# Patient Record
Sex: FEMALE | Race: OTHER | HISPANIC OR LATINO | URBAN - METROPOLITAN AREA
[De-identification: names, ages, dates, MRNs, and addresses within clinical notes are randomized per-mention and may not be internally consistent; named-entity substitution may affect disease eponyms.]

---

## 2017-09-23 ENCOUNTER — EMERGENCY (EMERGENCY)
Facility: HOSPITAL | Age: 47
LOS: 1 days | Discharge: HOME | End: 2017-09-23
Attending: EMERGENCY MEDICINE
Payer: MEDICAID

## 2017-09-23 VITALS
HEIGHT: 64 IN | TEMPERATURE: 98 F | HEART RATE: 69 BPM | SYSTOLIC BLOOD PRESSURE: 123 MMHG | OXYGEN SATURATION: 100 % | WEIGHT: 130.07 LBS | DIASTOLIC BLOOD PRESSURE: 64 MMHG | RESPIRATION RATE: 18 BRPM

## 2017-09-23 PROCEDURE — 99283 EMERGENCY DEPT VISIT LOW MDM: CPT

## 2017-09-23 PROCEDURE — 96372 THER/PROPH/DIAG INJ SC/IM: CPT

## 2017-09-23 PROCEDURE — 99283 EMERGENCY DEPT VISIT LOW MDM: CPT | Mod: 25

## 2017-09-23 RX ORDER — DOCUSATE SODIUM 100 MG
100 CAPSULE ORAL ONCE
Qty: 0 | Refills: 0 | Status: COMPLETED | OUTPATIENT
Start: 2017-09-23 | End: 2017-09-23

## 2017-09-23 RX ORDER — LIDOCAINE 4 G/100G
1 CREAM TOPICAL ONCE
Qty: 0 | Refills: 0 | Status: COMPLETED | OUTPATIENT
Start: 2017-09-23 | End: 2017-09-23

## 2017-09-23 RX ORDER — DOCUSATE SODIUM 100 MG
1 CAPSULE ORAL
Qty: 60 | Refills: 0
Start: 2017-09-23 | End: 2017-10-23

## 2017-09-23 RX ORDER — IBUPROFEN 200 MG
1 TABLET ORAL
Qty: 30 | Refills: 0
Start: 2017-09-23 | End: 2017-10-03

## 2017-09-23 RX ORDER — HYDROCORTISONE 1 %
1 OINTMENT (GRAM) TOPICAL ONCE
Qty: 0 | Refills: 0 | Status: COMPLETED | OUTPATIENT
Start: 2017-09-23 | End: 2017-09-23

## 2017-09-23 RX ORDER — KETOROLAC TROMETHAMINE 30 MG/ML
60 SYRINGE (ML) INJECTION ONCE
Qty: 0 | Refills: 0 | Status: DISCONTINUED | OUTPATIENT
Start: 2017-09-23 | End: 2017-09-23

## 2017-09-23 RX ORDER — HYDROCORTISONE 1 %
1 OINTMENT (GRAM) TOPICAL
Qty: 1 | Refills: 0
Start: 2017-09-23 | End: 2017-10-03

## 2017-09-23 RX ADMIN — Medication 60 MILLIGRAM(S): at 19:31

## 2017-09-23 RX ADMIN — Medication 1 APPLICATION(S): at 19:28

## 2017-09-23 RX ADMIN — LIDOCAINE 1 APPLICATION(S): 4 CREAM TOPICAL at 19:29

## 2017-09-23 RX ADMIN — Medication 100 MILLIGRAM(S): at 18:52

## 2017-09-23 RX ADMIN — Medication 60 MILLIGRAM(S): at 18:52

## 2017-09-23 NOTE — ED PROVIDER NOTE - OBJECTIVE STATEMENT
45 y/o F pt with PMHx of hemorrhoids (during pregnancy) presents to ED c/o rectal pain secondary to hemorrhoids x 3 days. Pt states that she tried using Preparation H and Sitz bath with no relief of sx's. Pt denies fever, chills, vomiting, or any other complaints. NKDA

## 2017-09-23 NOTE — ED ADULT NURSE NOTE - OBJECTIVE STATEMENT
Patient presents to ED complains of hemorrhoids. Denies fever, chills, bleeding at site. Patient presents to ED with pain from hemorrhoids. Denies fever, chills, bleeding at site. Family at bedside. MD evaluation in progress.

## 2017-09-27 DIAGNOSIS — K64.4 RESIDUAL HEMORRHOIDAL SKIN TAGS: ICD-10-CM

## 2017-10-01 ENCOUNTER — EMERGENCY (EMERGENCY)
Facility: HOSPITAL | Age: 47
LOS: 1 days | Discharge: HOME | End: 2017-10-01
Attending: EMERGENCY MEDICINE
Payer: MEDICAID

## 2017-10-01 VITALS
HEART RATE: 77 BPM | WEIGHT: 130.07 LBS | DIASTOLIC BLOOD PRESSURE: 77 MMHG | RESPIRATION RATE: 18 BRPM | SYSTOLIC BLOOD PRESSURE: 114 MMHG | OXYGEN SATURATION: 100 % | HEIGHT: 64 IN

## 2017-10-01 VITALS
RESPIRATION RATE: 20 BRPM | HEART RATE: 74 BPM | OXYGEN SATURATION: 100 % | SYSTOLIC BLOOD PRESSURE: 112 MMHG | DIASTOLIC BLOOD PRESSURE: 77 MMHG

## 2017-10-01 DIAGNOSIS — R05 COUGH: ICD-10-CM

## 2017-10-01 DIAGNOSIS — R11.0 NAUSEA: ICD-10-CM

## 2017-10-01 LAB
ALBUMIN SERPL ELPH-MCNC: 3.8 G/DL — SIGNIFICANT CHANGE UP (ref 3.5–5)
ALP SERPL-CCNC: 75 U/L — SIGNIFICANT CHANGE UP (ref 40–120)
ALT FLD-CCNC: 24 U/L DA — SIGNIFICANT CHANGE UP (ref 10–60)
ANION GAP SERPL CALC-SCNC: 7 MMOL/L — SIGNIFICANT CHANGE UP (ref 5–17)
APPEARANCE UR: CLEAR — SIGNIFICANT CHANGE UP
AST SERPL-CCNC: 14 U/L — SIGNIFICANT CHANGE UP (ref 10–40)
BASOPHILS # BLD AUTO: 0.1 K/UL — SIGNIFICANT CHANGE UP (ref 0–0.2)
BASOPHILS NFR BLD AUTO: 1.5 % — SIGNIFICANT CHANGE UP (ref 0–2)
BILIRUB SERPL-MCNC: 0.4 MG/DL — SIGNIFICANT CHANGE UP (ref 0.2–1.2)
BILIRUB UR-MCNC: NEGATIVE — SIGNIFICANT CHANGE UP
BUN SERPL-MCNC: 9 MG/DL — SIGNIFICANT CHANGE UP (ref 7–18)
CALCIUM SERPL-MCNC: 8.7 MG/DL — SIGNIFICANT CHANGE UP (ref 8.4–10.5)
CHLORIDE SERPL-SCNC: 110 MMOL/L — HIGH (ref 96–108)
CK SERPL-CCNC: 78 U/L — SIGNIFICANT CHANGE UP (ref 21–215)
CO2 SERPL-SCNC: 25 MMOL/L — SIGNIFICANT CHANGE UP (ref 22–31)
COLOR SPEC: YELLOW — SIGNIFICANT CHANGE UP
CREAT SERPL-MCNC: 0.71 MG/DL — SIGNIFICANT CHANGE UP (ref 0.5–1.3)
DIFF PNL FLD: NEGATIVE — SIGNIFICANT CHANGE UP
EOSINOPHIL # BLD AUTO: 0.1 K/UL — SIGNIFICANT CHANGE UP (ref 0–0.5)
EOSINOPHIL NFR BLD AUTO: 1.9 % — SIGNIFICANT CHANGE UP (ref 0–6)
GLUCOSE SERPL-MCNC: 95 MG/DL — SIGNIFICANT CHANGE UP (ref 70–99)
GLUCOSE UR QL: NEGATIVE — SIGNIFICANT CHANGE UP
HCG UR QL: NEGATIVE — SIGNIFICANT CHANGE UP
HCT VFR BLD CALC: 37.1 % — SIGNIFICANT CHANGE UP (ref 34.5–45)
HGB BLD-MCNC: 11.1 G/DL — LOW (ref 11.5–15.5)
INR BLD: 1.15 RATIO — SIGNIFICANT CHANGE UP (ref 0.88–1.16)
KETONES UR-MCNC: NEGATIVE — SIGNIFICANT CHANGE UP
LEUKOCYTE ESTERASE UR-ACNC: NEGATIVE — SIGNIFICANT CHANGE UP
LYMPHOCYTES # BLD AUTO: 1.1 K/UL — SIGNIFICANT CHANGE UP (ref 1–3.3)
LYMPHOCYTES # BLD AUTO: 29.1 % — SIGNIFICANT CHANGE UP (ref 13–44)
MCHC RBC-ENTMCNC: 25.3 PG — LOW (ref 27–34)
MCHC RBC-ENTMCNC: 30 GM/DL — LOW (ref 32–36)
MCV RBC AUTO: 84.5 FL — SIGNIFICANT CHANGE UP (ref 80–100)
MONOCYTES # BLD AUTO: 0.4 K/UL — SIGNIFICANT CHANGE UP (ref 0–0.9)
MONOCYTES NFR BLD AUTO: 10.4 % — SIGNIFICANT CHANGE UP (ref 2–14)
NEUTROPHILS # BLD AUTO: 2.2 K/UL — SIGNIFICANT CHANGE UP (ref 1.8–7.4)
NEUTROPHILS NFR BLD AUTO: 57.1 % — SIGNIFICANT CHANGE UP (ref 43–77)
NITRITE UR-MCNC: NEGATIVE — SIGNIFICANT CHANGE UP
PH UR: 7 — SIGNIFICANT CHANGE UP (ref 5–8)
PLATELET # BLD AUTO: 157 K/UL — SIGNIFICANT CHANGE UP (ref 150–400)
POTASSIUM SERPL-MCNC: 4.3 MMOL/L — SIGNIFICANT CHANGE UP (ref 3.5–5.3)
POTASSIUM SERPL-SCNC: 4.3 MMOL/L — SIGNIFICANT CHANGE UP (ref 3.5–5.3)
PROT SERPL-MCNC: 7.8 G/DL — SIGNIFICANT CHANGE UP (ref 6–8.3)
PROT UR-MCNC: NEGATIVE — SIGNIFICANT CHANGE UP
PROTHROM AB SERPL-ACNC: 12.6 SEC — SIGNIFICANT CHANGE UP (ref 9.8–12.7)
RBC # BLD: 4.39 M/UL — SIGNIFICANT CHANGE UP (ref 3.8–5.2)
RBC # FLD: 14 % — SIGNIFICANT CHANGE UP (ref 10.3–14.5)
SODIUM SERPL-SCNC: 142 MMOL/L — SIGNIFICANT CHANGE UP (ref 135–145)
SP GR SPEC: 1 — LOW (ref 1.01–1.02)
TROPONIN I SERPL-MCNC: <0.015 NG/ML — SIGNIFICANT CHANGE UP (ref 0–0.04)
UROBILINOGEN FLD QL: NEGATIVE — SIGNIFICANT CHANGE UP
WBC # BLD: 3.9 K/UL — SIGNIFICANT CHANGE UP (ref 3.8–10.5)
WBC # FLD AUTO: 3.9 K/UL — SIGNIFICANT CHANGE UP (ref 3.8–10.5)

## 2017-10-01 PROCEDURE — 71020: CPT | Mod: 26

## 2017-10-01 PROCEDURE — 99285 EMERGENCY DEPT VISIT HI MDM: CPT

## 2017-10-01 PROCEDURE — 94640 AIRWAY INHALATION TREATMENT: CPT

## 2017-10-01 PROCEDURE — 93005 ELECTROCARDIOGRAM TRACING: CPT

## 2017-10-01 PROCEDURE — 84484 ASSAY OF TROPONIN QUANT: CPT

## 2017-10-01 PROCEDURE — 85610 PROTHROMBIN TIME: CPT

## 2017-10-01 PROCEDURE — 81025 URINE PREGNANCY TEST: CPT

## 2017-10-01 PROCEDURE — 82550 ASSAY OF CK (CPK): CPT

## 2017-10-01 PROCEDURE — 71046 X-RAY EXAM CHEST 2 VIEWS: CPT

## 2017-10-01 PROCEDURE — 96374 THER/PROPH/DIAG INJ IV PUSH: CPT

## 2017-10-01 PROCEDURE — 85027 COMPLETE CBC AUTOMATED: CPT

## 2017-10-01 PROCEDURE — 99284 EMERGENCY DEPT VISIT MOD MDM: CPT | Mod: 25

## 2017-10-01 PROCEDURE — 80053 COMPREHEN METABOLIC PANEL: CPT

## 2017-10-01 PROCEDURE — 81003 URINALYSIS AUTO W/O SCOPE: CPT

## 2017-10-01 RX ORDER — SODIUM CHLORIDE 9 MG/ML
1000 INJECTION INTRAMUSCULAR; INTRAVENOUS; SUBCUTANEOUS ONCE
Qty: 0 | Refills: 0 | Status: COMPLETED | OUTPATIENT
Start: 2017-10-01 | End: 2017-10-01

## 2017-10-01 RX ORDER — ALBUTEROL 90 UG/1
2 AEROSOL, METERED ORAL
Qty: 1 | Refills: 0
Start: 2017-10-01 | End: 2017-10-08

## 2017-10-01 RX ORDER — IPRATROPIUM/ALBUTEROL SULFATE 18-103MCG
3 AEROSOL WITH ADAPTER (GRAM) INHALATION ONCE
Qty: 0 | Refills: 0 | Status: COMPLETED | OUTPATIENT
Start: 2017-10-01 | End: 2017-10-01

## 2017-10-01 RX ORDER — ONDANSETRON 8 MG/1
4 TABLET, FILM COATED ORAL ONCE
Qty: 0 | Refills: 0 | Status: COMPLETED | OUTPATIENT
Start: 2017-10-01 | End: 2017-10-01

## 2017-10-01 RX ORDER — ONDANSETRON 8 MG/1
1 TABLET, FILM COATED ORAL
Qty: 6 | Refills: 0
Start: 2017-10-01 | End: 2017-10-03

## 2017-10-01 RX ADMIN — SODIUM CHLORIDE 2000 MILLILITER(S): 9 INJECTION INTRAMUSCULAR; INTRAVENOUS; SUBCUTANEOUS at 12:32

## 2017-10-01 RX ADMIN — Medication 3 MILLILITER(S): at 13:31

## 2017-10-01 RX ADMIN — Medication 3 MILLILITER(S): at 12:56

## 2017-10-01 RX ADMIN — ONDANSETRON 4 MILLIGRAM(S): 8 TABLET, FILM COATED ORAL at 12:31

## 2017-10-01 NOTE — ED PROVIDER NOTE - MEDICAL DECISION MAKING DETAILS
Pt c/o of dry cough consistent with bronchitis v pneumonia. CXR negative, will give Duoneb. Pt's VSS, afebrile, and denies nausea or vomiting s/p Zofran; plan to discharge home with pulmonary f/u due to length of cough, and supportive care. Pt c/o of dry cough consistent with bronchitis v pneumonia. CXR negative, will give Duoneb. Pt's VSS, afebrile, and denies nausea or vomiting s/p Zofran, negative pregnancy, no associated sx; plan to discharge home with pulmonary f/u due to length of cough, and supportive care.

## 2017-10-01 NOTE — ED PROVIDER NOTE - PROGRESS NOTE DETAILS
confirmed improvement with duonebs, discussed cxr findings, no hx ca, no unintentional weight loss, will dc home with results and follow up with pulmonology and pmd for repeat imaging.

## 2017-10-01 NOTE — ED PROVIDER NOTE - ATTENDING CONTRIBUTION TO CARE
47 y/o F pt with no significant pmhx presents to ED c/o worsening dry cough x 2 weeks with associated sore throat and chest discomfort.  Patient denies F/C, sick contacts, or chest pain.    JOCELIN BELLE, ATTENDING NOTE:  Patient is awake and alert and in no acute distress.  Normocephalic/atraumatic.  Auricles are normal.  Neck supple.  Lungs B/L diffuse wheeze.  Heart is regular rate and rhythm.  Abdomen is soft, not distended +BS.  Back is nontender, no CVAT.  Moving all 4 extremities.   Neurologically grossly intact.  Affect is appropriate.   DR. BELLE, ATTENDING MD-  I performed a face to face bedside interview with patient regarding history of present illness, review of symptoms and past medical history. I completed an independent physical exam.  I have discussed patient's plan of care with the NP.   I agree with note as stated above, having amended the EMR as needed to reflect my findings. I have discussed the assessment and plan of care.  This includes during the time I functioned as the attending physician for this patient.

## 2017-10-01 NOTE — ED PROVIDER NOTE - NS_ ATTENDINGSCRIBEDETAILS _ED_A_ED_FT
The scribe's documentation has been prepared under the direction of Sushila Marshall (NP).  I've reviewed the EMR and agree with the note as documented.

## 2017-10-01 NOTE — ED PROVIDER NOTE - ENMT, MLM
Airway patent, Nasal mucosa clear. Mouth with normal mucosa. Throat has no vesicles, no oropharyngeal exudates and uvula is midline. No frontal maxillary tenderness.

## 2017-10-01 NOTE — ED ADULT NURSE NOTE - OBJECTIVE STATEMENT
pt came in 45 y/o female with c/o chest pain and dizziness x 3 days pt alert oriented x 3 with no acute distress

## 2017-10-01 NOTE — ED PROVIDER NOTE - OBJECTIVE STATEMENT
45 y/o F pt with PMHx of hemorrhoids presents to ED c/o worsening dry cough with associated sx's of sore throat and chest discomfort (secondary to vomiting x last night and this morning; nonbloody and nonbilious) x 2 weeks. Pt denies sick contacts, fever, chills, hematuria, dysuria, melena, diarrhea, nasal congestion, or any other complaints. NKDA. 45 y/o F pt with no significant pmhx presents to ED c/o worsening dry cough x2 weeks with associated sx's of sore throat and chest discomfort (secondary to nb/nb vomiting last night. Pt denies sick contacts, fever, chills, cp, palpitations, sob, dyspnea, diaphoresis, abdominal pain, bladder discomfort, hematuria, dysuria, melena, diarrhea, unintentional weight loss, hx ca, or any other complaints. NKDA. Nonsmoker. No hx asthma. 45 y/o F pt with no significant pmhx presents to ED c/o worsening dry cough x2 weeks with associated sx's of sore throat and chest discomfort (secondary to nb/nb vomiting last night). Pt denies sick contacts, fever, chills, cp, palpitations, sob, dyspnea, diaphoresis, abdominal pain, bladder discomfort, hematuria, dysuria, melena, diarrhea, unintentional weight loss, hx ca, or any other complaints. NKDA. Nonsmoker. No hx asthma.

## 2019-08-29 ENCOUNTER — EMERGENCY (EMERGENCY)
Facility: HOSPITAL | Age: 49
LOS: 1 days | Discharge: HOME | End: 2019-08-29
Attending: EMERGENCY MEDICINE
Payer: MEDICAID

## 2019-08-29 VITALS
TEMPERATURE: 98 F | OXYGEN SATURATION: 99 % | DIASTOLIC BLOOD PRESSURE: 65 MMHG | HEART RATE: 68 BPM | SYSTOLIC BLOOD PRESSURE: 98 MMHG | RESPIRATION RATE: 18 BRPM

## 2019-08-29 VITALS
HEART RATE: 75 BPM | SYSTOLIC BLOOD PRESSURE: 111 MMHG | RESPIRATION RATE: 22 BRPM | OXYGEN SATURATION: 100 % | TEMPERATURE: 99 F | DIASTOLIC BLOOD PRESSURE: 75 MMHG

## 2019-08-29 PROBLEM — K64.9 UNSPECIFIED HEMORRHOIDS: Chronic | Status: ACTIVE | Noted: 2017-09-23

## 2019-08-29 LAB
ALBUMIN SERPL ELPH-MCNC: 3.5 G/DL — SIGNIFICANT CHANGE UP (ref 3.5–5)
ALP SERPL-CCNC: 85 U/L — SIGNIFICANT CHANGE UP (ref 40–120)
ALT FLD-CCNC: 19 U/L DA — SIGNIFICANT CHANGE UP (ref 10–60)
ANION GAP SERPL CALC-SCNC: 6 MMOL/L — SIGNIFICANT CHANGE UP (ref 5–17)
AST SERPL-CCNC: 12 U/L — SIGNIFICANT CHANGE UP (ref 10–40)
BILIRUB SERPL-MCNC: 0.4 MG/DL — SIGNIFICANT CHANGE UP (ref 0.2–1.2)
BUN SERPL-MCNC: 7 MG/DL — SIGNIFICANT CHANGE UP (ref 7–18)
CALCIUM SERPL-MCNC: 8.2 MG/DL — LOW (ref 8.4–10.5)
CHLORIDE SERPL-SCNC: 111 MMOL/L — HIGH (ref 96–108)
CO2 SERPL-SCNC: 25 MMOL/L — SIGNIFICANT CHANGE UP (ref 22–31)
CREAT SERPL-MCNC: 0.77 MG/DL — SIGNIFICANT CHANGE UP (ref 0.5–1.3)
D DIMER BLD IA.RAPID-MCNC: 640 NG/ML DDU — HIGH
GLUCOSE SERPL-MCNC: 97 MG/DL — SIGNIFICANT CHANGE UP (ref 70–99)
HCG UR QL: NEGATIVE — SIGNIFICANT CHANGE UP
HCT VFR BLD CALC: 33.1 % — LOW (ref 34.5–45)
HGB BLD-MCNC: 10.2 G/DL — LOW (ref 11.5–15.5)
MCHC RBC-ENTMCNC: 25.8 PG — LOW (ref 27–34)
MCHC RBC-ENTMCNC: 30.8 GM/DL — LOW (ref 32–36)
MCV RBC AUTO: 83.6 FL — SIGNIFICANT CHANGE UP (ref 80–100)
NRBC # BLD: 0 /100 WBCS — SIGNIFICANT CHANGE UP (ref 0–0)
PLATELET # BLD AUTO: 212 K/UL — SIGNIFICANT CHANGE UP (ref 150–400)
POTASSIUM SERPL-MCNC: 4 MMOL/L — SIGNIFICANT CHANGE UP (ref 3.5–5.3)
POTASSIUM SERPL-SCNC: 4 MMOL/L — SIGNIFICANT CHANGE UP (ref 3.5–5.3)
PROT SERPL-MCNC: 7.3 G/DL — SIGNIFICANT CHANGE UP (ref 6–8.3)
RBC # BLD: 3.96 M/UL — SIGNIFICANT CHANGE UP (ref 3.8–5.2)
RBC # FLD: 14.6 % — HIGH (ref 10.3–14.5)
SODIUM SERPL-SCNC: 142 MMOL/L — SIGNIFICANT CHANGE UP (ref 135–145)
TROPONIN I SERPL-MCNC: <0.015 NG/ML — SIGNIFICANT CHANGE UP (ref 0–0.04)
WBC # BLD: 4.1 K/UL — SIGNIFICANT CHANGE UP (ref 3.8–10.5)
WBC # FLD AUTO: 4.1 K/UL — SIGNIFICANT CHANGE UP (ref 3.8–10.5)

## 2019-08-29 PROCEDURE — 96374 THER/PROPH/DIAG INJ IV PUSH: CPT | Mod: XU

## 2019-08-29 PROCEDURE — 99285 EMERGENCY DEPT VISIT HI MDM: CPT

## 2019-08-29 PROCEDURE — 71275 CT ANGIOGRAPHY CHEST: CPT | Mod: 26

## 2019-08-29 PROCEDURE — 99284 EMERGENCY DEPT VISIT MOD MDM: CPT | Mod: 25

## 2019-08-29 PROCEDURE — 93005 ELECTROCARDIOGRAM TRACING: CPT

## 2019-08-29 PROCEDURE — 71275 CT ANGIOGRAPHY CHEST: CPT

## 2019-08-29 PROCEDURE — 36415 COLL VENOUS BLD VENIPUNCTURE: CPT

## 2019-08-29 PROCEDURE — 81025 URINE PREGNANCY TEST: CPT

## 2019-08-29 PROCEDURE — 71046 X-RAY EXAM CHEST 2 VIEWS: CPT | Mod: 26

## 2019-08-29 PROCEDURE — 93010 ELECTROCARDIOGRAM REPORT: CPT

## 2019-08-29 PROCEDURE — 85027 COMPLETE CBC AUTOMATED: CPT

## 2019-08-29 PROCEDURE — 85379 FIBRIN DEGRADATION QUANT: CPT

## 2019-08-29 PROCEDURE — 71046 X-RAY EXAM CHEST 2 VIEWS: CPT

## 2019-08-29 PROCEDURE — 84484 ASSAY OF TROPONIN QUANT: CPT

## 2019-08-29 PROCEDURE — 80053 COMPREHEN METABOLIC PANEL: CPT

## 2019-08-29 RX ORDER — KETOROLAC TROMETHAMINE 30 MG/ML
15 SYRINGE (ML) INJECTION ONCE
Refills: 0 | Status: DISCONTINUED | OUTPATIENT
Start: 2019-08-29 | End: 2019-08-29

## 2019-08-29 RX ORDER — ACETAMINOPHEN 500 MG
650 TABLET ORAL ONCE
Refills: 0 | Status: COMPLETED | OUTPATIENT
Start: 2019-08-29 | End: 2019-08-29

## 2019-08-29 RX ADMIN — Medication 15 MILLIGRAM(S): at 14:30

## 2019-08-29 RX ADMIN — Medication 650 MILLIGRAM(S): at 14:29

## 2019-08-29 NOTE — ED PROVIDER NOTE - PATIENT PORTAL LINK FT
You can access the FollowMyHealth Patient Portal offered by [facility] by registering at the following website: [email or web address]. By joining [employer]’s FollowMyHealth portal, you will also be able to view your health information using other applications (apps) compatible with our system.

## 2019-08-29 NOTE — ED PROVIDER NOTE - CLINICAL SUMMARY MEDICAL DECISION MAKING FREE TEXT BOX
47 y/o F with no pmh present to the ED with CP. ekg reviewed and no signs of ischemia. labs are noncontributory. cxr- neg for PTX or consolidation.  pt still had pain after pain meds. will get d-dimer, though her PERC is neg.

## 2019-08-29 NOTE — ED ADULT NURSE NOTE - ED STAT RN HANDOFF DETAILS
Pt remains stable, AOX4, no s/s of any distress noted. IV line in place, IV fluids infusing as per orders, no signs of infiltration noted. Tolerating diet. VS WNL. Call bell in reach, bed in lowest position. Safety maintained, hourly rounding performed. Endorsed to nurse Sanford.

## 2019-08-29 NOTE — ED ADULT NURSE NOTE - OBJECTIVE STATEMENT
Pt AOx4, ambulatory, c/o left sided CP, and HA x 2 days, Pt denies SOB, syncope, fall/trauma. No changes in vision, or radiation of pain. EKG done. No distress noted.

## 2019-08-29 NOTE — ED ADULT NURSE NOTE - CHPI ED NUR SYMPTOMS NEG
no diaphoresis/no fever/no vomiting/no nausea/no back pain/no congestion/no syncope/no shortness of breath/no dizziness/no palpitations

## 2019-08-29 NOTE — ED PROVIDER NOTE - PROGRESS NOTE DETAILS
pt still had pain despite treatment. appears well. will get d-dimer Goss:  Pt received in signout from Dr. Florez, with plan to check CTA chest r/o PE and if negative for acute findings, to D/C home given that EKG and initial troponin negative and chest discomfort continuous over time course of 2 days.  CTA shows no PE.  Advised strict return precautions and PMD f/u.

## 2019-08-29 NOTE — ED PROVIDER NOTE - OBJECTIVE STATEMENT
47 y/o F with no significant PMHx presents to ED complaining of 2d of continuous sternal chest pain. Pt explains the pain is pleuritic and pressure like with no radiation. Pt states her pain started while she was sewing 2d ago. Pt denies any recent travel or sick contact. Pt also denies any leg swelling, fever, chills. nausea, vomiting, abdominal pain, back pain, or any other complaints. NKDA.

## 2019-09-08 ENCOUNTER — EMERGENCY (EMERGENCY)
Facility: HOSPITAL | Age: 49
LOS: 1 days | Discharge: HOME | End: 2019-09-08
Attending: EMERGENCY MEDICINE
Payer: MEDICAID

## 2019-09-08 VITALS
OXYGEN SATURATION: 100 % | HEIGHT: 64 IN | HEART RATE: 88 BPM | SYSTOLIC BLOOD PRESSURE: 110 MMHG | DIASTOLIC BLOOD PRESSURE: 76 MMHG | RESPIRATION RATE: 18 BRPM | TEMPERATURE: 98 F | WEIGHT: 119.93 LBS

## 2019-09-08 DIAGNOSIS — Z90.49 ACQUIRED ABSENCE OF OTHER SPECIFIED PARTS OF DIGESTIVE TRACT: Chronic | ICD-10-CM

## 2019-09-08 DIAGNOSIS — Z90.79 ACQUIRED ABSENCE OF OTHER GENITAL ORGAN(S): Chronic | ICD-10-CM

## 2019-09-08 PROCEDURE — 96372 THER/PROPH/DIAG INJ SC/IM: CPT

## 2019-09-08 PROCEDURE — 99283 EMERGENCY DEPT VISIT LOW MDM: CPT

## 2019-09-08 PROCEDURE — 99283 EMERGENCY DEPT VISIT LOW MDM: CPT | Mod: 25

## 2019-09-08 RX ORDER — HYDROCORTISONE 1 %
1 OINTMENT (GRAM) TOPICAL
Qty: 1 | Refills: 0
Start: 2019-09-08 | End: 2019-09-17

## 2019-09-08 RX ORDER — DOCUSATE SODIUM 100 MG
1 CAPSULE ORAL
Qty: 60 | Refills: 0
Start: 2019-09-08 | End: 2019-10-07

## 2019-09-08 RX ORDER — KETOROLAC TROMETHAMINE 30 MG/ML
30 SYRINGE (ML) INJECTION ONCE
Refills: 0 | Status: DISCONTINUED | OUTPATIENT
Start: 2019-09-08 | End: 2019-09-08

## 2019-09-08 RX ADMIN — Medication 30 MILLIGRAM(S): at 09:44

## 2019-09-08 NOTE — ED PROVIDER NOTE - PHYSICAL EXAMINATION
Afebrile, hemodynamically stable, saturating well  NAD, well appearing  Head NCAT  EOMI grossly, anicteric  MMM  RRR, nml S1/S2, no m/r/g  Lungs CTAB, no w/r/r  Abd soft, NT, ND, nml BS, no rebound or guarding  Rectal (scribe and RN as chaperone): noted multiple external hemorrhoids, no overlying erythema, some surrounding blood with no active bleeding  AAO, CN's 3-12 grossly intact  ALVAREZ spontaneously, no leg cyanosis or edema  Skin warm, well perfused, no rashes or hives

## 2019-09-08 NOTE — ED PROVIDER NOTE - CARE PROVIDER_API CALL
William La)  ColonRectal Surgery; Surgery  [facility], [address] [place] [address]  Phone: [phone]  Fax: [phone]  Follow Up Time: 1-3 Days

## 2019-09-08 NOTE — ED PROVIDER NOTE - OBJECTIVE STATEMENT
49 y/o F with PMHx of hemorrhoidectomy 30y ago presents to ED complaining of external hemorrhoids x1w.. Pt's daughter is at bedside. Pt states her pain is constant since Tuesday and has been getting worse since yesterday. Pt reports taking Tylenol to no relief. Pt adds that sometimes it is hard to poop but has normal bowel movements. Pt denies any other medical problems or taking any medications. Pt denies bleeding, or any other complaints. NKDA. 47 y/o F with PMHx of hemorrhoidectomy and persistent hemorrhoids, presents to ED complaining of external hemorrhoids x1w. Pt's daughter is at bedside. Pt states her pain is constant since Tuesday and has been getting worse since yesterday. Pt reports taking Tylenol to no relief. Pt adds that sometimes it is hard to poop but has normal bowel movements. Pt denies any other medical problems or taking any medications. Pt denies bleeding, abdominal pain, vomiting, or any other complaints. NKDA.

## 2019-09-08 NOTE — ED PROVIDER NOTE - CLINICAL SUMMARY MEDICAL DECISION MAKING FREE TEXT BOX
Does not appear to be thrombosed or superinfected. No active bleeding. Especially with past surgical hx in area and nonthrombosis will not attempt to open. Attempted reduction with minimal success 2/2 pain. Applied viscous topical lido and IM Toradol with improvement (pt emphatically denies possibility of pregnancy). Patient is nontoxic appearing, NAD, afebrile, hemodynamically stable. Discharged with rx Colace and Anucort, strict instructions in further symptomatic care including sitz and not to push, return precautions, and need for surgical f/u and given number for this.

## 2019-09-08 NOTE — ED PROVIDER NOTE - PATIENT PORTAL LINK FT
You can access the FollowMyHealth Patient Portal offered by [facility] by registering at the following website: [email or web address]. By joining [employer]’s FollowMyHealth portal, you will also be able to view your health information using other applications (apps) compatible with our system.

## 2019-09-08 NOTE — ED ADULT NURSE NOTE - NSFALLRSKHARMRISK_ED_ALL_ED
no Patient presents with complaints of generalized abdominal pain with most pain on right upper quadrant that started today after eating. Denies fever, chills , nausea, vomiting. Patient is alert and oriented x 4, normal sinus rhythm on cardiac monitor, respirations are even and unlabored, abdomen is soft and nontender, no edema noted. 20 gauge saline lock placed on left Ac, blood drawn and sent. UCG negative. Will follow up and monitor.

## 2019-09-19 ENCOUNTER — EMERGENCY (EMERGENCY)
Facility: HOSPITAL | Age: 49
LOS: 1 days | Discharge: LEFT BEFORE DISCHARGE | End: 2019-09-19
Attending: EMERGENCY MEDICINE
Payer: MEDICAID

## 2019-09-19 VITALS
RESPIRATION RATE: 16 BRPM | SYSTOLIC BLOOD PRESSURE: 124 MMHG | HEIGHT: 64 IN | TEMPERATURE: 99 F | WEIGHT: 119.93 LBS | OXYGEN SATURATION: 100 % | DIASTOLIC BLOOD PRESSURE: 77 MMHG | HEART RATE: 98 BPM

## 2019-09-19 DIAGNOSIS — Z90.49 ACQUIRED ABSENCE OF OTHER SPECIFIED PARTS OF DIGESTIVE TRACT: Chronic | ICD-10-CM

## 2019-09-19 DIAGNOSIS — Z90.79 ACQUIRED ABSENCE OF OTHER GENITAL ORGAN(S): Chronic | ICD-10-CM

## 2019-09-19 PROCEDURE — 99281 EMR DPT VST MAYX REQ PHY/QHP: CPT

## 2019-09-20 RX ORDER — IOHEXOL 300 MG/ML
30 INJECTION, SOLUTION INTRAVENOUS ONCE
Refills: 0 | Status: DISCONTINUED | OUTPATIENT
Start: 2019-09-20 | End: 2019-09-20

## 2020-09-29 ENCOUNTER — EMERGENCY (EMERGENCY)
Facility: HOSPITAL | Age: 50
LOS: 1 days | Discharge: HOME | End: 2020-09-29
Attending: EMERGENCY MEDICINE
Payer: MEDICAID

## 2020-09-29 VITALS
SYSTOLIC BLOOD PRESSURE: 117 MMHG | HEIGHT: 64 IN | RESPIRATION RATE: 18 BRPM | DIASTOLIC BLOOD PRESSURE: 77 MMHG | TEMPERATURE: 98 F | OXYGEN SATURATION: 98 % | HEART RATE: 90 BPM

## 2020-09-29 DIAGNOSIS — Z90.49 ACQUIRED ABSENCE OF OTHER SPECIFIED PARTS OF DIGESTIVE TRACT: Chronic | ICD-10-CM

## 2020-09-29 DIAGNOSIS — Z90.79 ACQUIRED ABSENCE OF OTHER GENITAL ORGAN(S): Chronic | ICD-10-CM

## 2020-09-29 PROCEDURE — 99283 EMERGENCY DEPT VISIT LOW MDM: CPT

## 2020-09-29 RX ORDER — LIDOCAINE 4 G/100G
1 CREAM TOPICAL
Qty: 6 | Refills: 0
Start: 2020-09-29 | End: 2020-09-30

## 2020-09-29 RX ORDER — OXYCODONE AND ACETAMINOPHEN 5; 325 MG/1; MG/1
1 TABLET ORAL ONCE
Refills: 0 | Status: DISCONTINUED | OUTPATIENT
Start: 2020-09-29 | End: 2020-09-29

## 2020-09-29 RX ORDER — LIDOCAINE 4 G/100G
1 CREAM TOPICAL ONCE
Refills: 0 | Status: COMPLETED | OUTPATIENT
Start: 2020-09-29 | End: 2020-09-29

## 2020-09-29 RX ORDER — OXYCODONE AND ACETAMINOPHEN 5; 325 MG/1; MG/1
1 TABLET ORAL
Qty: 10 | Refills: 0
Start: 2020-09-29 | End: 2020-10-01

## 2020-09-29 RX ADMIN — LIDOCAINE 1 PATCH: 4 CREAM TOPICAL at 11:34

## 2020-09-29 RX ADMIN — OXYCODONE AND ACETAMINOPHEN 1 TABLET(S): 5; 325 TABLET ORAL at 11:34

## 2020-09-29 NOTE — ED ADULT NURSE NOTE - OBJECTIVE STATEMENT
pt is here for rash.  pt stated that painful rashes to right back and under right breast for 1 week, denied fever or chills, c/o pain 6-8/10, denied chest pain or sob, no distress noted at this time.

## 2020-09-29 NOTE — ED PROVIDER NOTE - CLINICAL SUMMARY MEDICAL DECISION MAKING FREE TEXT BOX
pt with shingles for about two weeks  now with pain   given that the rash has scabbed over will give topical lidocaine.   also will give a short course of opiates. given length of time valtrex not indicated at this chago

## 2020-09-29 NOTE — ED ADULT TRIAGE NOTE - CHIEF COMPLAINT QUOTE
c/o painful rashes to Rt back and under Rt breast x 1 week , saw pmd was told has Herpes on meds but nit feeling better

## 2020-09-29 NOTE — ED PROVIDER NOTE - PATIENT PORTAL LINK FT
You can access the FollowMyHealth Patient Portal offered by [facility] by registering at the following website: [email or web address]. By joining [employer]’s FollowMyHealth portal, you will also be able to view your health information using other applications (apps) compatible with our system.

## 2020-09-29 NOTE — ED PROVIDER NOTE - OBJECTIVE STATEMENT
pt with shingles for about two weeks complaining about severe pain pt with shingles for about two weeks complaining about severe pain pt taking motrin at home with little to no relief

## 2020-09-29 NOTE — ED PROVIDER NOTE - NSFOLLOWUPINSTRUCTIONS_ED_ALL_ED_FT
Shingles    WHAT YOU NEED TO KNOW:    Shingles is a painful rash. Shingles is caused by the same virus that causes chickenpox (varicella-zoster). After you get chickenpox, the virus stays in your body for several years without causing any symptoms. Shingles occurs when the virus becomes active again. The active virus travels along a nerve to your skin and causes a rash.    DISCHARGE INSTRUCTIONS:    Call your local emergency number (911 in the ) if:   •You have trouble moving your arms, legs, or face.      •You become confused, or have difficulty speaking.      •You have a seizure.      Return to the emergency department if:   •You have weakness in an arm or leg.      •You have dizziness, a severe headache, or hearing or vision loss.      •You have painful, red, warm skin around the blisters, or the blisters drain pus.      •Your neck is stiff or you have trouble moving it.      Call your doctor if:   •You feel weak or have a headache.      •You have a cough, chills, or a fever.      •You have abdominal pain or nausea, or you are vomiting.      •Your rash becomes more itchy or painful.      •Your rash spreads to other parts of your body.      •Your pain worsens and does not go away even after you take medicine.      •You have questions or concerns about your condition or care.      Medicines: You may need any of the following:  •Antiviral medicine helps decrease symptoms and healing time. They may also decrease your risk of developing nerve pain. You will need to start taking them within 3 days of the start of symptoms to prevent nerve pain.      •Prescription pain medicine may be given. Ask your healthcare provider how to take this medicine safely. Some prescription pain medicines contain acetaminophen. Do not take other medicines that contain acetaminophen without talking to your healthcare provider. Too much acetaminophen may cause liver damage. Prescription pain medicine may cause constipation. Ask your healthcare provider how to prevent or treat constipation.       •Acetaminophen decreases pain and fever. It is available without a doctor's order. Ask how much to take and how often to take it. Follow directions. Read the labels of all other medicines you are using to see if they also contain acetaminophen, or ask your doctor or pharmacist. Acetaminophen can cause liver damage if not taken correctly. Do not use more than 4 grams (4,000 milligrams) total of acetaminophen in one day.       •NSAIDs, such as ibuprofen, help decrease swelling, pain, and fever. This medicine is available with or without a doctor's order. NSAIDs can cause stomach bleeding or kidney problems in certain people. If you take blood thinner medicine, always ask if NSAIDs are safe for you. Always read the medicine label and follow directions. Do not give these medicines to children under 6 months of age without direction from your child's healthcare provider.      •Topical anesthetics are used to numb the skin and decrease pain. They can be a cream, gel, spray, or patch.       •Anticonvulsants decrease nerve pain and may help you sleep at night.      •Antidepressants may be used to decrease nerve pain.      •Take your medicine as directed. Contact your healthcare provider if you think your medicine is not helping or if you have side effects. Tell him of her if you are allergic to any medicine. Keep a list of the medicines, vitamins, and herbs you take. Include the amounts, and when and why you take them. Bring the list or the pill bottles to follow-up visits. Carry your medicine list with you in case of an emergency.      Self-care: Keep your rash clean and dry. Cover your rash with a bandage or clothing. Do not use bandages that stick to your skin. The sticky part may irritate your skin and make your rash last longer.    Prevent the spread of germs:          •Wash your hands often. Wash your hands several times each day. Wash after you use the bathroom, change a child's diaper, and before you prepare or eat food. Use soap and water every time. Rub your soapy hands together, lacing your fingers. Wash the front and back of your hands, and in between your fingers. Use the fingers of one hand to scrub under the fingernails of the other hand. Wash for at least 20 seconds. Rinse with warm, running water for several seconds. Then dry your hands with a clean towel or paper towel. Use hand  that contains alcohol if soap and water are not available. Do not touch your eyes, nose, or mouth without washing your hands first.  Handwashing           •Cover a sneeze or cough. Use a tissue that covers your mouth and nose. Throw the tissue away in a trash can right away. Use the bend of your arm if a tissue is not available. Wash your hands well with soap and water or use a hand .      •Stay away from others while you are sick. Avoid crowds as much as possible.      •Ask about vaccines you may need. Talk to your healthcare provider about your vaccine history. He or she will tell you which vaccines you need, and when to get them.      Prevent shingles or another shingles outbreak: A vaccine may be given to help prevent shingles. You can get the vaccine even if you already had shingles. The vaccine can help prevent a future outbreak. If you do get shingles again, the vaccine can keep it from becoming severe. The vaccine comes in 2 forms. Your healthcare provider will tell you which form is right for you. The decision is based on your age and any medical conditions you have. A 2-dose vaccine is usually given to adults 50 years or older. A 1-dose vaccine may be given to adults 60 years or older.    For more information:   •Centers for Disease Control and Prevention  [address] [place]  Phone: [phone]  Phone: [phone]  Web Address: http://www.cdc.gov        Follow up with your doctor as directed: Write down your questions so you remember to ask them during your visits.       © Copyright [employer] 2020           back to top                          © Copyright [employer] 2020

## 2020-10-06 ENCOUNTER — EMERGENCY (EMERGENCY)
Facility: HOSPITAL | Age: 50
LOS: 1 days | Discharge: HOME | End: 2020-10-06
Attending: EMERGENCY MEDICINE
Payer: MEDICAID

## 2020-10-06 VITALS
SYSTOLIC BLOOD PRESSURE: 126 MMHG | RESPIRATION RATE: 16 BRPM | HEART RATE: 70 BPM | OXYGEN SATURATION: 98 % | TEMPERATURE: 98 F | DIASTOLIC BLOOD PRESSURE: 75 MMHG | WEIGHT: 139.99 LBS | HEIGHT: 64 IN

## 2020-10-06 VITALS — SYSTOLIC BLOOD PRESSURE: 139 MMHG | HEART RATE: 64 BPM | DIASTOLIC BLOOD PRESSURE: 83 MMHG

## 2020-10-06 DIAGNOSIS — Z90.79 ACQUIRED ABSENCE OF OTHER GENITAL ORGAN(S): Chronic | ICD-10-CM

## 2020-10-06 DIAGNOSIS — Z90.49 ACQUIRED ABSENCE OF OTHER SPECIFIED PARTS OF DIGESTIVE TRACT: Chronic | ICD-10-CM

## 2020-10-06 PROCEDURE — 96374 THER/PROPH/DIAG INJ IV PUSH: CPT

## 2020-10-06 PROCEDURE — 99284 EMERGENCY DEPT VISIT MOD MDM: CPT | Mod: 25

## 2020-10-06 PROCEDURE — 99284 EMERGENCY DEPT VISIT MOD MDM: CPT

## 2020-10-06 RX ORDER — OXYCODONE AND ACETAMINOPHEN 5; 325 MG/1; MG/1
1 TABLET ORAL
Qty: 10 | Refills: 0
Start: 2020-10-06 | End: 2020-10-09

## 2020-10-06 RX ORDER — MORPHINE SULFATE 50 MG/1
4 CAPSULE, EXTENDED RELEASE ORAL ONCE
Refills: 0 | Status: DISCONTINUED | OUTPATIENT
Start: 2020-10-06 | End: 2020-10-06

## 2020-10-06 RX ORDER — GABAPENTIN 400 MG/1
1 CAPSULE ORAL
Qty: 60 | Refills: 0
Start: 2020-10-06 | End: 2020-11-04

## 2020-10-06 RX ADMIN — MORPHINE SULFATE 4 MILLIGRAM(S): 50 CAPSULE, EXTENDED RELEASE ORAL at 21:35

## 2020-10-06 RX ADMIN — MORPHINE SULFATE 4 MILLIGRAM(S): 50 CAPSULE, EXTENDED RELEASE ORAL at 21:03

## 2020-10-06 NOTE — ED ADULT TRIAGE NOTE - CHIEF COMPLAINT QUOTE
pain right midaxillary , lower right ribs from rashes x 20 days , and pain left wrist /thumb x 5 days

## 2020-10-06 NOTE — ED PROVIDER NOTE - PATIENT PORTAL LINK FT
You can access the FollowMyHealth Patient Portal offered by [facility] by registering at the following website: [email or web address]. By joining [employer]’s FollowMyHealth portal, you will also be able to view your health information using other applications (apps) compatible with our system.

## 2020-10-06 NOTE — ED PROVIDER NOTE - PHYSICAL EXAMINATION
R flank/R side of abdomen scattered rash crusted over with small scabs to some areas. No crossing midline. No induration, fluctuance or streaking. Localized tenderness to palpation.

## 2020-10-06 NOTE — ED PROVIDER NOTE - OBJECTIVE STATEMENT
49 year-old female, presents with cc persistent severe pain to R flank/R abdomen area x 2 weeks. Reports that shingles appeared 2 weeks ago and since then has had severe pain. Was evaluated in the ER in 09/2020 and was given pain medications which she finished but she is still having severe pain. The rash has crusted over since the last visit. Denies any arm/leg pain, numbness, tingling, focal weakness, chest pain, SOB, fever, chills or any other complaints.

## 2020-10-06 NOTE — ED PROVIDER NOTE - NSFOLLOWUPINSTRUCTIONS_ED_ALL_ED_FT
Follow up with the primary care doctor in 2-3 days.    Instructions to take Gabapentin:  1 pill once a day for first day, then 1 pill twice a day for the next day, then 1 pill three times a day for the rest of the course of treatment. If you feel drowsy with the medication you can increase the frequency slower (e.g. 1 pill once a day for few days then twice a day for few days then eventually 3 times a day) to decrease the amplitude of the side effects.    If you experience any new or worsening symptoms or if you are concerned you can always come back to the emergency for a re-evaluation.    If there were any prescriptions given to you during the visit today take them as prescribed. If you have any questions you can ask the pharmacist.

## 2020-10-06 NOTE — ED PROVIDER NOTE - NSFOLLOWUPCLINICS_GEN_ALL_ED_FT
[facility]  Internal Medicine  [address] [place] [address]  Phone: [phone]  Fax: [phone]  Follow Up Time:

## 2020-10-06 NOTE — ED PROVIDER NOTE - ATTENDING CONTRIBUTION TO CARE
50 yo F with R flank pain after recent shingles infection     VS reviewed  R flank crusting/healing herpes infection  AAOx3    AP - healing herpes infx, possibly developing post herpetic neuralgia  dc with gabapentin and pcp fu

## 2021-04-10 ENCOUNTER — EMERGENCY (EMERGENCY)
Facility: HOSPITAL | Age: 51
LOS: 1 days | Discharge: HOME | End: 2021-04-10
Attending: STUDENT IN AN ORGANIZED HEALTH CARE EDUCATION/TRAINING PROGRAM
Payer: MEDICAID

## 2021-04-10 VITALS
TEMPERATURE: 98 F | WEIGHT: 100.09 LBS | OXYGEN SATURATION: 98 % | HEIGHT: 64 IN | SYSTOLIC BLOOD PRESSURE: 133 MMHG | DIASTOLIC BLOOD PRESSURE: 85 MMHG | HEART RATE: 70 BPM | RESPIRATION RATE: 18 BRPM

## 2021-04-10 DIAGNOSIS — Z90.79 ACQUIRED ABSENCE OF OTHER GENITAL ORGAN(S): Chronic | ICD-10-CM

## 2021-04-10 DIAGNOSIS — Z90.49 ACQUIRED ABSENCE OF OTHER SPECIFIED PARTS OF DIGESTIVE TRACT: Chronic | ICD-10-CM

## 2021-04-10 LAB
ALBUMIN SERPL ELPH-MCNC: 3.6 G/DL — SIGNIFICANT CHANGE UP (ref 3.5–5)
ALP SERPL-CCNC: 123 U/L — HIGH (ref 40–120)
ALT FLD-CCNC: 45 U/L DA — SIGNIFICANT CHANGE UP (ref 10–60)
ANION GAP SERPL CALC-SCNC: 4 MMOL/L — LOW (ref 5–17)
APPEARANCE UR: CLEAR — SIGNIFICANT CHANGE UP
AST SERPL-CCNC: 24 U/L — SIGNIFICANT CHANGE UP (ref 10–40)
BACTERIA # UR AUTO: ABNORMAL /HPF
BASOPHILS # BLD AUTO: 0.05 K/UL — SIGNIFICANT CHANGE UP (ref 0–0.2)
BASOPHILS NFR BLD AUTO: 0.9 % — SIGNIFICANT CHANGE UP (ref 0–2)
BILIRUB SERPL-MCNC: 0.2 MG/DL — SIGNIFICANT CHANGE UP (ref 0.2–1.2)
BILIRUB UR-MCNC: NEGATIVE — SIGNIFICANT CHANGE UP
BUN SERPL-MCNC: 13 MG/DL — SIGNIFICANT CHANGE UP (ref 7–18)
CALCIUM SERPL-MCNC: 8.5 MG/DL — SIGNIFICANT CHANGE UP (ref 8.4–10.5)
CHLORIDE SERPL-SCNC: 107 MMOL/L — SIGNIFICANT CHANGE UP (ref 96–108)
CO2 SERPL-SCNC: 28 MMOL/L — SIGNIFICANT CHANGE UP (ref 22–31)
COLOR SPEC: YELLOW — SIGNIFICANT CHANGE UP
CREAT SERPL-MCNC: 0.87 MG/DL — SIGNIFICANT CHANGE UP (ref 0.5–1.3)
DIFF PNL FLD: ABNORMAL
EOSINOPHIL # BLD AUTO: 0.13 K/UL — SIGNIFICANT CHANGE UP (ref 0–0.5)
EOSINOPHIL NFR BLD AUTO: 2.3 % — SIGNIFICANT CHANGE UP (ref 0–6)
EPI CELLS # UR: SIGNIFICANT CHANGE UP /HPF
GLUCOSE SERPL-MCNC: 95 MG/DL — SIGNIFICANT CHANGE UP (ref 70–99)
GLUCOSE UR QL: NEGATIVE — SIGNIFICANT CHANGE UP
HCT VFR BLD CALC: 39.3 % — SIGNIFICANT CHANGE UP (ref 34.5–45)
HGB BLD-MCNC: 13.4 G/DL — SIGNIFICANT CHANGE UP (ref 11.5–15.5)
IMM GRANULOCYTES NFR BLD AUTO: 0 % — SIGNIFICANT CHANGE UP (ref 0–1.5)
KETONES UR-MCNC: NEGATIVE — SIGNIFICANT CHANGE UP
LEUKOCYTE ESTERASE UR-ACNC: NEGATIVE — SIGNIFICANT CHANGE UP
LYMPHOCYTES # BLD AUTO: 2.11 K/UL — SIGNIFICANT CHANGE UP (ref 1–3.3)
LYMPHOCYTES # BLD AUTO: 37.7 % — SIGNIFICANT CHANGE UP (ref 13–44)
MCHC RBC-ENTMCNC: 30.6 PG — SIGNIFICANT CHANGE UP (ref 27–34)
MCHC RBC-ENTMCNC: 34.1 GM/DL — SIGNIFICANT CHANGE UP (ref 32–36)
MCV RBC AUTO: 89.7 FL — SIGNIFICANT CHANGE UP (ref 80–100)
MONOCYTES # BLD AUTO: 0.45 K/UL — SIGNIFICANT CHANGE UP (ref 0–0.9)
MONOCYTES NFR BLD AUTO: 8.1 % — SIGNIFICANT CHANGE UP (ref 2–14)
NEUTROPHILS # BLD AUTO: 2.85 K/UL — SIGNIFICANT CHANGE UP (ref 1.8–7.4)
NEUTROPHILS NFR BLD AUTO: 51 % — SIGNIFICANT CHANGE UP (ref 43–77)
NITRITE UR-MCNC: NEGATIVE — SIGNIFICANT CHANGE UP
NRBC # BLD: 0 /100 WBCS — SIGNIFICANT CHANGE UP (ref 0–0)
PH UR: 6 — SIGNIFICANT CHANGE UP (ref 5–8)
PLATELET # BLD AUTO: 184 K/UL — SIGNIFICANT CHANGE UP (ref 150–400)
POTASSIUM SERPL-MCNC: 4.1 MMOL/L — SIGNIFICANT CHANGE UP (ref 3.5–5.3)
POTASSIUM SERPL-SCNC: 4.1 MMOL/L — SIGNIFICANT CHANGE UP (ref 3.5–5.3)
PROT SERPL-MCNC: 7.3 G/DL — SIGNIFICANT CHANGE UP (ref 6–8.3)
PROT UR-MCNC: NEGATIVE — SIGNIFICANT CHANGE UP
RBC # BLD: 4.38 M/UL — SIGNIFICANT CHANGE UP (ref 3.8–5.2)
RBC # FLD: 11.7 % — SIGNIFICANT CHANGE UP (ref 10.3–14.5)
RBC CASTS # UR COMP ASSIST: ABNORMAL /HPF (ref 0–2)
SODIUM SERPL-SCNC: 139 MMOL/L — SIGNIFICANT CHANGE UP (ref 135–145)
SP GR SPEC: 1.01 — SIGNIFICANT CHANGE UP (ref 1.01–1.02)
UROBILINOGEN FLD QL: NEGATIVE — SIGNIFICANT CHANGE UP
WBC # BLD: 5.59 K/UL — SIGNIFICANT CHANGE UP (ref 3.8–10.5)
WBC # FLD AUTO: 5.59 K/UL — SIGNIFICANT CHANGE UP (ref 3.8–10.5)
WBC UR QL: SIGNIFICANT CHANGE UP /HPF (ref 0–5)

## 2021-04-10 PROCEDURE — 99283 EMERGENCY DEPT VISIT LOW MDM: CPT

## 2021-04-10 PROCEDURE — 85025 COMPLETE CBC W/AUTO DIFF WBC: CPT

## 2021-04-10 PROCEDURE — 36415 COLL VENOUS BLD VENIPUNCTURE: CPT

## 2021-04-10 PROCEDURE — 81001 URINALYSIS AUTO W/SCOPE: CPT

## 2021-04-10 PROCEDURE — 80053 COMPREHEN METABOLIC PANEL: CPT

## 2021-04-10 PROCEDURE — 99284 EMERGENCY DEPT VISIT MOD MDM: CPT

## 2021-04-10 RX ORDER — HYDROCORTISONE 1 %
1 OINTMENT (GRAM) TOPICAL ONCE
Refills: 0 | Status: COMPLETED | OUTPATIENT
Start: 2021-04-10 | End: 2021-04-10

## 2021-04-10 RX ORDER — LIDOCAINE/HYDROCORTISONE AC 3 %-0.5 %
1 CREAM WITH APPLICATOR RECTAL
Qty: 5 | Refills: 0
Start: 2021-04-10 | End: 2021-04-14

## 2021-04-10 RX ORDER — ACETAMINOPHEN 500 MG
975 TABLET ORAL ONCE
Refills: 0 | Status: COMPLETED | OUTPATIENT
Start: 2021-04-10 | End: 2021-04-10

## 2021-04-10 RX ORDER — CEFDINIR 250 MG/5ML
1 POWDER, FOR SUSPENSION ORAL
Qty: 10 | Refills: 0
Start: 2021-04-10 | End: 2021-04-14

## 2021-04-10 RX ADMIN — Medication 1 APPLICATION(S): at 21:51

## 2021-04-10 RX ADMIN — Medication 975 MILLIGRAM(S): at 21:06

## 2021-04-10 RX ADMIN — Medication 975 MILLIGRAM(S): at 21:51

## 2021-04-10 NOTE — ED PROVIDER NOTE - OBJECTIVE STATEMENT
51 y/o F pt with a PMHx of Chronic Hemorrhoids and Chronic abd pain (unknown source, already worked up for it) presents to ED c/o 1 week engorgement of external hemorrhoids as well as pain prior to that. Pt reportedly had a couple of bouts of rectal bleeding but since the engorgement of hemorrhoids has not had more rectal bleeding. Pt also c/o suprapubic type of pain although only sometimes, mostly when sitting or straining. Pt denies dysuria, hematuria, frequency, fever, chills, dizziness, syncope or any other acute complaints. NKDA.

## 2021-04-10 NOTE — ED PROVIDER NOTE - PATIENT PORTAL LINK FT
You can access the FollowMyHealth Patient Portal offered by [facility] by registering at the following website: [email or web address]. By joining [employer]’s FollowMyHealth portal, you will also be able to view your health information using other applications (apps) compatible with our system.

## 2021-04-10 NOTE — ED ADULT NURSE NOTE - OBJECTIVE STATEMENT
AOX4 +ambulatory patient reports lower abdominal pain for 1 week with mild nausea. Denies any urinary symptoms no fevers or chills

## 2021-04-10 NOTE — ED PROVIDER NOTE - CLINICAL SUMMARY MEDICAL DECISION MAKING FREE TEXT BOX
Since pt had several episodes of blood per rectum, will check CBC, will also check UA. Given suprapubic pain, will treat hemorrhoids with topical hydrocortisone 2.% and likely d/c w/close f/u with surgery. Since pt had several episodes of blood per rectum, will check CBC, will also check UA. Given suprapubic pain, will treat hemorrhoids with topical hydrocortisone 2.% and likely d/c w/close f/u with surgery.    UA w/ blood and WBC +bacteria. Will treat with cefdinir outpatient and topical hydrocortisone.

## 2021-04-10 NOTE — ED PROVIDER NOTE - RECTAL
Multiple external hemorrhoids mostly deflated but a couple indurated w/no bluish discoloration and no signs of strangulation. Digital rectal: no pain to the rectum, no abscesses, no internal hemorrhoids felt, no blood.

## 2021-04-11 PROBLEM — B02.9 ZOSTER WITHOUT COMPLICATIONS: Chronic | Status: ACTIVE | Noted: 2020-10-06

## 2021-04-13 PROBLEM — R10.9 UNSPECIFIED ABDOMINAL PAIN: Chronic | Status: ACTIVE | Noted: 2021-04-10

## 2021-05-04 ENCOUNTER — APPOINTMENT (OUTPATIENT)
Dept: GASTROENTEROLOGY | Facility: CLINIC | Age: 51
End: 2021-05-04
Payer: MEDICAID

## 2021-05-04 VITALS
HEART RATE: 64 BPM | TEMPERATURE: 96.9 F | SYSTOLIC BLOOD PRESSURE: 134 MMHG | BODY MASS INDEX: 25.27 KG/M2 | HEIGHT: 64 IN | OXYGEN SATURATION: 99 % | DIASTOLIC BLOOD PRESSURE: 83 MMHG | RESPIRATION RATE: 18 BRPM | WEIGHT: 148 LBS

## 2021-05-04 DIAGNOSIS — Z78.9 OTHER SPECIFIED HEALTH STATUS: ICD-10-CM

## 2021-05-04 DIAGNOSIS — Z01.818 ENCOUNTER FOR OTHER PREPROCEDURAL EXAMINATION: ICD-10-CM

## 2021-05-04 DIAGNOSIS — Z12.11 ENCOUNTER FOR SCREENING FOR MALIGNANT NEOPLASM OF COLON: ICD-10-CM

## 2021-05-04 DIAGNOSIS — K64.8 OTHER HEMORRHOIDS: ICD-10-CM

## 2021-05-04 DIAGNOSIS — K62.89 OTHER SPECIFIED DISEASES OF ANUS AND RECTUM: ICD-10-CM

## 2021-05-04 DIAGNOSIS — Z82.49 FAMILY HISTORY OF ISCHEMIC HEART DISEASE AND OTHER DISEASES OF THE CIRCULATORY SYSTEM: ICD-10-CM

## 2021-05-04 PROBLEM — Z00.00 ENCOUNTER FOR PREVENTIVE HEALTH EXAMINATION: Status: ACTIVE | Noted: 2021-05-04

## 2021-05-04 PROCEDURE — 99072 ADDL SUPL MATRL&STAF TM PHE: CPT

## 2021-05-04 PROCEDURE — 99204 OFFICE O/P NEW MOD 45 MIN: CPT

## 2021-05-04 NOTE — HISTORY OF PRESENT ILLNESS
[FreeTextEntry1] : Patient is a 50-year-old female who presented to the emergency room 2 to 3 weeks ago with pain in the rectal area and several lumps in the perianal area.  She did have some bright red blood on the paper.  She was treated with a cream with some improvement in her symptoms.  Her bowel movements are regular.  She has never had a screening colonoscopy.

## 2021-05-04 NOTE — ASSESSMENT
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  She will be given a hemorrhoidal cream.  She will need to be scheduled for a screening colonoscopy.\par After the colonoscopy, she will be referred to surgery for hemorrhoidectomy.

## 2021-05-16 ENCOUNTER — APPOINTMENT (OUTPATIENT)
Dept: DISASTER EMERGENCY | Facility: CLINIC | Age: 51
End: 2021-05-16

## 2021-07-14 ENCOUNTER — APPOINTMENT (OUTPATIENT)
Dept: GASTROENTEROLOGY | Facility: CLINIC | Age: 51
End: 2021-07-14

## 2021-07-16 ENCOUNTER — RESULT REVIEW (OUTPATIENT)
Age: 51
End: 2021-07-16

## 2021-07-16 ENCOUNTER — OUTPATIENT (OUTPATIENT)
Dept: OUTPATIENT SERVICES | Facility: HOSPITAL | Age: 51
LOS: 1 days | Discharge: HOME | End: 2021-07-16
Payer: MEDICAID

## 2021-07-16 ENCOUNTER — APPOINTMENT (OUTPATIENT)
Dept: GASTROENTEROLOGY | Facility: HOSPITAL | Age: 51
End: 2021-07-16
Payer: MEDICAID

## 2021-07-16 VITALS
HEART RATE: 70 BPM | DIASTOLIC BLOOD PRESSURE: 59 MMHG | SYSTOLIC BLOOD PRESSURE: 104 MMHG | OXYGEN SATURATION: 100 % | RESPIRATION RATE: 17 BRPM

## 2021-07-16 VITALS
HEIGHT: 64 IN | HEART RATE: 74 BPM | DIASTOLIC BLOOD PRESSURE: 68 MMHG | SYSTOLIC BLOOD PRESSURE: 130 MMHG | OXYGEN SATURATION: 98 % | WEIGHT: 145.06 LBS | TEMPERATURE: 97 F | RESPIRATION RATE: 16 BRPM

## 2021-07-16 DIAGNOSIS — Z12.11 ENCOUNTER FOR SCREENING FOR MALIGNANT NEOPLASM OF COLON: ICD-10-CM

## 2021-07-16 DIAGNOSIS — Z90.49 ACQUIRED ABSENCE OF OTHER SPECIFIED PARTS OF DIGESTIVE TRACT: Chronic | ICD-10-CM

## 2021-07-16 DIAGNOSIS — Z90.79 ACQUIRED ABSENCE OF OTHER GENITAL ORGAN(S): Chronic | ICD-10-CM

## 2021-07-16 PROCEDURE — 88305 TISSUE EXAM BY PATHOLOGIST: CPT | Mod: 26

## 2021-07-16 PROCEDURE — 45380 COLONOSCOPY AND BIOPSY: CPT | Mod: 33

## 2021-07-16 RX ORDER — SODIUM CHLORIDE 9 MG/ML
1000 INJECTION, SOLUTION INTRAVENOUS
Refills: 0 | Status: DISCONTINUED | OUTPATIENT
Start: 2021-07-16 | End: 2021-07-30

## 2021-07-16 NOTE — ASU PATIENT PROFILE, ADULT - ACCEPTABLE
Outpatient IV Medication Monitoring   Antony requires the following IV therapy outpatient:     1. Micafungin 150 mg IV daily - DISCONTINUE 7/30  2. Filgrastim 250 mcg (5 mcg/kg) IV daily     Everything was discussed with Dr. Mckeon and communicated to [facility].   Antony will return to clinic tomorrow, 7/31, for labs and reassessment.      Pharmacy will continue to follow.  Naima Packer, RahatD    
4

## 2021-07-19 LAB — SURGICAL PATHOLOGY STUDY: SIGNIFICANT CHANGE UP

## 2022-11-10 NOTE — ED ADULT NURSE NOTE - DISCHARGE DATE/TIME
NEPHROLOGY-[facility] [phone]        Patient seen and examined in the chair.  She was the same and not on pressors         MEDICATIONS  (STANDING):  acetaminophen     Tablet .. 650 milliGRAM(s) Oral every 6 hours  aMIOdarone    Tablet 400 milliGRAM(s) Oral two times a day  ascorbic acid 500 milliGRAM(s) Oral two times a day  aspirin enteric coated 81 milliGRAM(s) Oral daily  atorvastatin 80 milliGRAM(s) Oral at bedtime  chlorhexidine 2% Cloths 1 Application(s) Topical daily  dextrose 50% Injectable 50 milliLiter(s) IV Push every 15 minutes  dextrose 50% Injectable 25 milliLiter(s) IV Push every 15 minutes  enoxaparin Injectable 30 milliGRAM(s) SubCutaneous every 12 hours  famotidine Injectable 20 milliGRAM(s) IV Push every 24 hours  gabapentin 100 milliGRAM(s) Oral every 12 hours  insulin regular Infusion 3 Unit(s)/Hr (3 mL/Hr) IV Continuous <Continuous>  metoclopramide Injectable 5 milliGRAM(s) IV Push every 12 hours  polyethylene glycol 3350 17 Gram(s) Oral daily  senna 2 Tablet(s) Oral at bedtime  sodium chloride 0.9%. 1000 milliLiter(s) (10 mL/Hr) IV Continuous <Continuous>      VITAL:  T(C): , Max: 36.8 (22 @ 20:00)  T(F): , Max: 98.2 (22 @ 20:00)  HR: 97 (11-10-22 @ 08:00)  BP: --  BP(mean): --  RR: 6 (11-10-22 @ 08:00)  SpO2: 100% (11-10-22 @ 08:00)  Wt(kg): --    I and O's:     @ 07:01  -  11-10 @ 07:00  --------------------------------------------------------  IN: 2151.1 mL / OUT: 1440 mL / NET: 711.1 mL    11-10 @ 07:01  -  11-10 @ 08:20  --------------------------------------------------------  IN: 253 mL / OUT: 0 mL / NET: 253 mL          PHYSICAL EXAM:    Constitutional: NAD  Neck:  No JVD  Respiratory: CTAB/L  Cardiovascular: S1 and S2  Gastrointestinal: BS+, soft, NT/ND  Extremities: No peripheral edema  Neurological: A/O x 3, no focal deficits  Psychiatric: Normal mood, normal affect  : No Bah  Skin: No rashes  Access: perm cath avf     LABS:                        11.4   19.82 )-----------( 198      ( 10 Nov 2022 00:22 )             34.4     11-10    139  |  101  |  22  ----------------------------<  128<H>  4.5   |  26  |  1.77<H>    Ca    8.4      10 Nov 2022 04:38  Phos  4.4     11-10  Mg     2.2     -10    TPro  6.0  /  Alb  3.1<L>  /  TBili  0.6  /  DBili  x   /  AST  23  /  ALT  10  /  AlkPhos  95  11-10          Urine Studies:  Urinalysis Basic - ( 2022 23:14 )    Color: Orange / Appearance: Turbid / S.022 / pH: x  Gluc: x / Ketone: Negative  / Bili: Negative / Urobili: Negative   Blood: x / Protein: 300 mg/dL / Nitrite: Negative   Leuk Esterase: Large / RBC: 35 /hpf / WBC 5434 /HPF   Sq Epi: x / Non Sq Epi: 44 /hpf / Bacteria: Moderate            RADIOLOGY & ADDITIONAL STUDIES:             06-Oct-2020 21:36

## 2023-01-09 NOTE — ED ADULT TRIAGE NOTE - HEIGHT IN INCHES
EMERGENCY DEPARTMENT ENCOUNTER    Room Number:  14/14  Date seen:  1/9/2023  PCP: Patric Cameron DO  Historian: Patient, wife, daughter      HPI:  Chief Complaint: Chest pain  A complete HPI/ROS/PMH/PSH/SH/FH are unobtainable due to: Patient has dementia  Context: Yoni Bonner Jr. is a 88 y.o. male who presents to the ED from home by EMS c/o chest pain.  Per family, patient began complaining of pain in his left chest several hours ago.  Pain was worse with movement and deep breath.  Patient took 3 nitroglycerin at home and was given 3 additional nitroglycerin by EMS with no relief.  He is not currently having chest pain.  Patient has had a productive cough and wheezing for the past 2 to 3 weeks.  He has been using his nebulizer and inhalers regularly with some relief.  Denies fever, chills, sore throat, or runny nose.  Patient has also had several episodes of watery diarrhea today.  Denies nausea, vomiting, abdominal pain, dysuria, or leg swelling.  Eliquis is the only medicine that he takes.  He is under hospice care.  Glucose was 82 per EMS.  He was given 150 cc of normal saline in route.  He has a history of pulmonary embolus.            PAST MEDICAL HISTORY  Active Ambulatory Problems     Diagnosis Date Noted   • HTN (hypertension) 03/08/2017   • Complete tear of right rotator cuff 04/13/2016   • Onychomycosis due to dermatophyte 04/27/2015   • Left rotator cuff tear arthropathy 01/13/2016   • Obesity 04/27/2015   • Anemia, chronic disease 03/29/2017   • Right shoulder pain 04/13/2016   • S/p reverse total shoulder arthroplasty 04/11/2017   • Episode of syncope 04/16/2018   • Coronary artery disease involving native coronary artery of native heart without angina pectoris 04/16/2018   • Chest pain 12/06/2018   • CKD (chronic kidney disease) stage 3, GFR 30-59 ml/min ([facility]) 01/06/2019   • Nonrheumatic aortic valve stenosis 03/20/2019   • COPD (chronic obstructive pulmonary disease) ([facility]) 07/24/2019   •  HLD (hyperlipidemia) 07/25/2019   • Contusion of right leg 05/19/2021   • Knee pain 02/11/2020   • Primary osteoarthritis of left knee 02/11/2020   • Orthostatic hypotension 01/13/2022   • Hyponatremia 09/23/2022   • Atrial fibrillation ([facility]) 09/23/2022   • Sepsis due to Escherichia coli ([facility]) 10/10/2022   • S/P hip replacement 10/10/2022   • Acute pulmonary embolism ([facility]) 10/10/2022   • Sepsis, due to unspecified organism, unspecified whether acute organ dysfunction present ([facility]) 10/10/2022   • UTI due to extended-spectrum beta lactamase (ESBL) producing Escherichia coli 10/13/2022   • Diastolic CHF, acute on chronic ([facility]) 10/13/2022   • Type 2 MI (myocardial infarction) ([facility]) 10/15/2022   • Episode of unresponsiveness 10/22/2022     Resolved Ambulatory Problems     Diagnosis Date Noted   • Acute ST elevation myocardial infarction (STEMI) of inferior wall ([facility]) 04/08/2018   • Contusion of left knee 01/09/2016   • Left shoulder pain 04/13/2016   • Tobacco abuse 04/16/2018   • History of coronary artery stent placement 04/16/2018   • Chronic systolic congestive heart failure ([facility]) 04/16/2018   • Ischemic cardiomyopathy 05/08/2018   • Exertional chest pain 07/18/2018   • Acute bronchitis due to Rhinovirus 01/09/2019   • Elevated troponin 07/25/2019   • Slurred speech 09/23/2022   • Left displaced femoral neck fracture ([facility]) 09/23/2022   • Shortness of breath 10/10/2022     Past Medical History:   Diagnosis Date   • Bronchitis    • CAD (coronary artery disease)    • Carotid arterial disease ([facility])    • Chronic bronchitis ([facility])    • Gout    • Hyperlipidemia    • Hypertension    • Mild aortic stenosis    • Mitral regurgitation    • Precordial chest pain    • Rheumatic fever    • Syncopal episodes    • Tobacco use          PAST SURGICAL HISTORY  Past Surgical History:   Procedure Laterality Date   • CARDIAC CATHETERIZATION     • CARDIAC CATHETERIZATION N/A 4/8/2018    Procedure: Left Heart Cath;  Surgeon: Nicolas Jerez MD;   Location: [facility] C[facility]ASIVE LOC[facility];  Service: Cardiovascular   • CARDIAC CATHETERIZATION  4/8/2018    Procedure: Percutaneous Manual Thrombectomy;  Surgeon: Nicolas Jerez MD;  Location: [facility] CATH [facility]AS[facility] LOC[facility];  Service: Cardiovascular   • CARDIAC CATHETERIZATION N/A 4/8/2018    Procedure: Stent CARLENE coronary;  Surgeon: Nicolas Jerez MD;  Location: [facility] CATH INVASIVE LOCATION;  Service: Cardiovascular   • CARDIAC CATHETERIZATION N/A 4/8/2018    Procedure: Right Heart Cath;  Surgeon: Nicolas Jerez MD;  Location: [facility] CATH INVASIVE LOCATION;  Service: Cardiovascular   • CARDIAC CATHETERIZATION Left 2/21/2020    Procedure: Cardiac Catheterization/Vascular Study;  Surgeon: Alejandro Jenkins MD;  Location: [facility] C[facility] LOC[facility];  Service: Cardiovascular;  Laterality: Left;   • CARDIAC CATHETERIZATION N/A 2/21/2020    Procedure: Coronary angiography;  Surgeon: Alejandro Jenkins MD;  Location: [facility] CATH INVASIVE LOCATION;  Service: Cardiovascular;  Laterality: N/A;   • EYE SURGERY      cataract surg   • HAND SURGERY     • HERNIA REPAIR     • KNEE SURGERY     • TESTICLE SURGERY     • TOTAL HIP ARTHROPLASTY Left 9/25/2022    Procedure: TOTAL HIP ARTHROPLASTY ANTERIOR WITH HANA TABLE;  Surgeon: Jeff Rodriguez II, MD;  Location: [facility] OR;  Service: Orthopedics;  Laterality: Left;         FAMILY HISTORY  Family History   Problem Relation Age of Onset   • Heart disease Father    • Hypertension Father    • Stroke Father    • Diabetes Sister    • Cancer Brother    • Heart disease Brother    • Hypertension Brother    • No Known Problems Maternal Grandmother    • No Known Problems Maternal Grandfather    • No Known Problems Paternal Grandmother    • No Known Problems Paternal Grandfather          SOCIAL HISTORY  Social History     Socioeconomic History   • Marital status:    Tobacco Use   • Smoking status: Never   • Smokeless tobacco: Current     Types: Chew   • Tobacco comments:     chewing  tobacco since 3 years old   Substance and Sexual Activity   • Alcohol use: No     Comment: caffeine use   • Drug use: Never   • Sexual activity: Defer         ALLERGIES  No known drug allergy        REVIEW OF SYSTEMS  Review of Systems   Limited secondary to dementia  All systems have been reviewed and are negative except as as discussed in the HPI    PHYSICAL EXAM  ED Triage Vitals [01/09/23 1353]   Temp Heart Rate Resp BP SpO2   97.5 °F (36.4 °C) 104 18 101/53 99 %      Temp src Heart Rate Source Patient Position BP Location FiO2 (%)   -- -- -- -- --       Physical Exam      GENERAL: Awake and alert.  Oriented to self and place.  Well-developed, well-nourished and nontoxic-appearing elderly male.  Resting comfortably in no acute distress  HENT: NCAT, nares patent  EYES: no scleral icterus  CV: regular rhythm, normal rate, equal radial pulses bilaterally  RESPIRATORY: normal effort, clear to auscultation bilaterally  ABDOMEN: soft, nontender  MUSCULOSKELETAL: Extremities are nontender with full range of motion.  Chest wall is nontender  NEURO: Speech is normal.  No facial droop.  Follows commands.  Normal strength in all extremities.  PSYCH:  calm, cooperative  SKIN: warm, dry    Vital signs and nursing notes reviewed.          LAB RESULTS  Recent Results (from the past 24 hour(s))   ECG 12 Lead Chest Pain    Collection Time: 01/09/23  2:19 PM   Result Value Ref Range    QT Interval 357 ms   Comprehensive Metabolic Panel    Collection Time: 01/09/23  3:38 PM    Specimen: Blood   Result Value Ref Range    Glucose 96 65 - 99 mg/dL    BUN 20 8 - 23 mg/dL    Creatinine 1.40 (H) 0.76 - 1.27 mg/dL    Sodium 132 (L) 136 - 145 mmol/L    Potassium 5.5 (H) 3.5 - 5.2 mmol/L    Chloride 98 98 - 107 mmol/L    CO2 20.9 (L) 22.0 - 29.0 mmol/L    Calcium 9.2 8.6 - 10.5 mg/dL    Total Protein 6.6 6.0 - 8.5 g/dL    Albumin 3.5 3.5 - 5.2 g/dL    ALT (SGPT) 6 1 - 41 U/L    AST (SGOT) 17 1 - 40 U/L    Alkaline Phosphatase 78 39 - 117  U/L    Total Bilirubin 0.2 0.0 - 1.2 mg/dL    Globulin 3.1 gm/dL    A/G Ratio 1.1 g/dL    BUN/Creatinine Ratio 14.3 7.0 - 25.0    Anion Gap 13.1 5.0 - 15.0 mmol/L    eGFR 48.0 (L) >60.0 mL/min/1.73   BNP    Collection Time: 01/09/23  3:38 PM    Specimen: Blood   Result Value Ref Range    proBNP 3,249.0 (H) 0.0 - 1,800.0 pg/mL   Troponin    Collection Time: 01/09/23  3:38 PM    Specimen: Blood   Result Value Ref Range    Troponin T 0.035 (C) 0.000 - 0.030 ng/mL   Respiratory Panel PCR w/COVID-19(SARS-CoV-2) NEFTALY/VERONICA/MARVIN/PAD/COR/MAD/ÁNGELA In-House, NP Swab in UTM/[facility], 3-4 HR TAT - Swab, Nasopharynx    Collection Time: 01/09/23  3:38 PM    Specimen: Nasopharynx; Swab   Result Value Ref Range    ADENOVIRUS, PCR Not Detected Not Detected    Coronavirus 229E Not Detected Not Detected    Coronavirus HKU1 Not Detected Not Detected    Coronavirus NL63 Not Detected Not Detected    Coronavirus OC43 Not Detected Not Detected    COVID19 Not Detected Not Detected - Ref. Range    Human Metapneumovirus Not Detected Not Detected    Human Rhinovirus/Enterovirus Not Detected Not Detected    Influenza A PCR Not Detected Not Detected    Influenza B PCR Not Detected Not Detected    Parainfluenza Virus 1 Not Detected Not Detected    Parainfluenza Virus 2 Not Detected Not Detected    Parainfluenza Virus 3 Not Detected Not Detected    Parainfluenza Virus 4 Not Detected Not Detected    RSV, PCR Not Detected Not Detected    Bordetella pertussis pcr Not Detected Not Detected    Bordetella parapertussis PCR Not Detected Not Detected    Chlamydophila pneumoniae PCR Not Detected Not Detected    Mycoplasma pneumo by PCR Not Detected Not Detected   Procalcitonin    Collection Time: 01/09/23  3:38 PM    Specimen: Blood   Result Value Ref Range    Procalcitonin 0.14 0.00 - 0.25 ng/mL   CBC Auto Differential    Collection Time: 01/09/23  3:38 PM    Specimen: Blood   Result Value Ref Range    WBC 8.54 3.40 - 10.80 10*3/mm3    RBC 3.82 (L) 4.14 - 5.80  10*6/mm3    Hemoglobin 10.5 (L) 13.0 - 17.7 g/dL    Hematocrit 32.6 (L) 37.5 - 51.0 %    MCV 85.3 79.0 - 97.0 fL    MCH 27.5 26.6 - 33.0 pg    MCHC 32.2 31.5 - 35.7 g/dL    RDW 12.8 12.3 - 15.4 %    RDW-SD 38.6 37.0 - 54.0 fl    MPV 11.8 6.0 - 12.0 fL    Platelets 236 140 - 450 10*3/mm3    Neutrophil % 49.4 42.7 - 76.0 %    Lymphocyte % 41.0 19.6 - 45.3 %    Monocyte % 6.6 5.0 - 12.0 %    Eosinophil % 2.2 0.3 - 6.2 %    Basophil % 0.7 0.0 - 1.5 %    Immature Grans % 0.1 0.0 - 0.5 %    Neutrophils, Absolute 4.22 1.70 - 7.00 10*3/mm3    Lymphocytes, Absolute 3.50 (H) 0.70 - 3.10 10*3/mm3    Monocytes, Absolute 0.56 0.10 - 0.90 10*3/mm3    Eosinophils, Absolute 0.19 0.00 - 0.40 10*3/mm3    Basophils, Absolute 0.06 0.00 - 0.20 10*3/mm3    Immature Grans, Absolute 0.01 0.00 - 0.05 10*3/mm3    nRBC 0.0 0.0 - 0.2 /100 WBC   Troponin    Collection Time: 01/09/23  5:54 PM    Specimen: Blood   Result Value Ref Range    Troponin T 0.020 0.000 - 0.030 ng/mL       Ordered the above labs and reviewed the results.        RADIOLOGY  XR Chest 1 View    Result Date: 1/9/2023  XR CHEST 1 VW-  HISTORY: Male who is 88 years-old,  cough, pain  TECHNIQUE: Frontal view of the chest  COMPARISON: 10/22/2022  FINDINGS: The heart size is normal. Aorta is tortuous, calcified. Pulmonary vasculature is unremarkable. No focal pulmonary consolidation, pleural effusion, or pneumothorax. No acute osseous process.      No focal pulmonary consolidation. Tortuous aorta. Follow-up as clinically indicated.  This report was finalized on 1/9/2023 4:01 PM by Dr. Immanuel Martinez M.D.        Ordered the above noted radiological studies. Reviewed by me in PACS.            PROCEDURES  Procedures            MEDICATIONS GIVEN IN ER  Medications   lactated ringers bolus 500 mL (0 mL Intravenous Stopped 1/9/23 1754)                   MEDICAL DECISION MAKING, PROGRESS, and CONSULTS    All labs have been independently reviewed by me.  All radiology studies have  been reviewed by me and I have also reviewed the radiology report.   EKG's independently viewed and interpreted by me.  Discussion below represents my analysis of pertinent findings related to patient's condition, differential diagnosis, treatment plan and final disposition.      Additional sources:  - Discussed/ obtained information from independent historians: Wife and daughter at bedside    - External (non-ED) record review: Patient was last admitted here in October 2022 after having an episode of unresponsiveness.  He had non-STEMI and was seen by cardiology.  He was admitted earlier in October for sepsis secondary to UTI.  He also had a PE.    - Chronic or social conditions impacting care: Patient is under hospice care          Orders placed during this visit:  Orders Placed This Encounter   Procedures   • Respiratory Panel PCR w/COVID-19(SARS-CoV-2) NEFTALY/VERONICA/MARVIN/PAD/COR/MAD/ÁNGELA In-House, NP Swab in UTM/VTM, 3-4 HR TAT - Swab, Nasopharynx   • XR Chest 1 View   • Comprehensive Metabolic Panel   • BNP   • Troponin   • Procalcitonin   • CBC Auto Differential   • Monitor Blood Pressure   • Cardiac Monitoring   • Pulse Oximetry, Continuous   • Discontinue Patient Isolation   • ECG 12 Lead Chest Pain   • CBC & Differential         Additional orders considered but not ordered:  None        Differential diagnosis:    DDx includes but is not limited to acute coronary syndrome, pulmonary embolism, thoracic aortic dissection, pneumonia, pneumothorax, musculoskeletal pain, GERD or esophageal spasm, anxiety, myocarditis/pericarditis, esophageal rupture, pancreatitis.             Independent interpretation of labs, radiology studies, and discussions with consultants:  ED Course as of 01/09/23 2142   Mon Jan 09, 2023   1521 EKG          EKG time: 1419  Rhythm/Rate: Sinus rhythm, rate 86  P waves and OH: Normal  QRS, axis: Normal axis, abnormal R wave progression  ST and T waves: Nonspecific ST/T wave changes in the lateral  leads    Interpreted Contemporaneously by me, independently viewed  EKG is changed compared to prior EKG done on 10/22/2022.  There were inverted T waves in the anterior leads at that time.   [WH]   1629 proBNP(!): 3,249.0 [WH]   1629 Procalcitonin: 0.14 [WH]   1629 WBC: 8.54 [WH]   1629 Hemoglobin(!): 10.5  8.5 two months ago [WH]   1630 Potassium(!): 5.5  Specimen was slightly hemolyzed [WH]   1630 Chest x-ray interpreted by me.  My interpretation is: No pneumothorax.  No effusion.  No infiltrates. [WH]   1655 Troponin T(!!): 0.035  0.256 two months ago [WH]   1914 Troponin T: 0.020 [WH]   1931 Test results discussed with the patient and his family.  He is resting comfortably and has not had any further chest pain while in the ED.  Vital signs remained stable.  Patient and family are comfortable with the plan for discharge. [WH]   1958 Patient presented to the ED after having an episode of left-sided chest discomfort earlier today.  Pain resolved prior to ED arrival.  EKG did not show any new ischemic changes.  Initial troponin was mildly elevated but repeat troponin was normal.  Chest x-ray did not show any evidence of pneumonia or congestive heart failure.  Respiratory panel was negative.  Creatinine was mildly elevated and the patient was given IV fluids.  Potassium was mildly elevated secondary to hemolysis.  Patient remained asymptomatic in the ED.  He is currently in hospice care.  He takes Eliquis so I doubt his symptoms were related to a pulmonary embolus. [WH]      ED Course User Index  [WH] Camden Mccrary MD               DIAGNOSIS  Final diagnoses:   Atypical chest pain   Subacute cough   Primary hypertension   Longstanding persistent atrial fibrillation (HCC)   Anticoagulated         DISPOSITION  DISCHARGE    Patient discharged in stable condition.    Reviewed implications of results, diagnosis, meds, responsibility to follow up, warning signs and symptoms of possible worsening, potential  complications and reasons to return to ER, including worsening or persistent pain, shortness of breath, fever, nausea, vomiting, sweating, or other concern.    Patient/Family voiced understanding of above instructions.    Discussed plan for discharge, as there is no emergent indication for admission. Patient referred to primary care provider for BP management due to today's BP. Pt/family is agreeable and understands need for follow up and repeat testing.  Pt is aware that discharge does not mean that nothing is wrong but it indicates no emergency is present that requires admission and they must continue care with follow-up as given below or physician of their choice.     FOLLOW-UP  [facility] CARDIOLOGY  [address] [address] [phone]  Schedule an appointment as soon as possible for a visit            Medication List      Changed    atorvastatin 10 MG tablet  Commonly known as: LIPITOR  Take 1 tablet by mouth Daily.  What changed: when to take this                      Latest Documented Vital Signs:  As of 21:42 EST  BP- 136/60 HR- 84 Temp- 97.5 °F (36.4 °C) O2 sat- 99%              --    Please note that portions of this were completed with a voice recognition program.       Note Disclaimer: At [facility], we believe that sharing information builds trust and better relationships. You are receiving this note because you are receiving care at [facility] or recently visited. It is possible you will see health information before a provider has talked with you about it. This kind of information can be easy to misunderstand. To help you fully understand what it means for your health, we urge you to discuss this note with your provider.           Camden Mccrary MD  01/09/23 8147     4

## 2023-09-22 NOTE — ED PROVIDER NOTE - HISTORY ATTESTATION, MLM
[facility]  [address] [facility] [address]  Dept Phone: [phone]        To the Parents of Vanessa Giordano  [address] [facility] [address]  September 27, 2023        Regarding: lab work needed for  blood cell count for Anemia . The orders have been placed, and you can take the patient  to any evaristo lab . Thank you  
I have reviewed and confirmed nurses' notes...

## 2023-12-27 NOTE — ED ADULT NURSE NOTE - CAS EDP DISCH TYPE
Home

## 2024-01-02 ENCOUNTER — TRANSCRIPTION ENCOUNTER (OUTPATIENT)
Age: 54
End: 2024-01-02

## 2024-01-02 ENCOUNTER — INPATIENT (INPATIENT)
Facility: HOSPITAL | Age: 54
LOS: 1 days | Discharge: HOME | DRG: 661 | End: 2024-01-04
Attending: UROLOGY | Admitting: UROLOGY
Payer: MEDICAID

## 2024-01-02 VITALS
HEART RATE: 81 BPM | TEMPERATURE: 98 F | HEIGHT: 64 IN | SYSTOLIC BLOOD PRESSURE: 151 MMHG | RESPIRATION RATE: 18 BRPM | OXYGEN SATURATION: 95 % | DIASTOLIC BLOOD PRESSURE: 78 MMHG | WEIGHT: 151.9 LBS

## 2024-01-02 DIAGNOSIS — Z90.49 ACQUIRED ABSENCE OF OTHER SPECIFIED PARTS OF DIGESTIVE TRACT: Chronic | ICD-10-CM

## 2024-01-02 DIAGNOSIS — Z90.79 ACQUIRED ABSENCE OF OTHER GENITAL ORGAN(S): Chronic | ICD-10-CM

## 2024-01-02 DIAGNOSIS — N20.0 CALCULUS OF KIDNEY: ICD-10-CM

## 2024-01-02 LAB
ALBUMIN SERPL ELPH-MCNC: 3.8 G/DL — SIGNIFICANT CHANGE UP (ref 3.5–5)
ALBUMIN SERPL ELPH-MCNC: 3.8 G/DL — SIGNIFICANT CHANGE UP (ref 3.5–5)
ALP SERPL-CCNC: 131 U/L — HIGH (ref 40–120)
ALP SERPL-CCNC: 131 U/L — HIGH (ref 40–120)
ALT FLD-CCNC: 32 U/L DA — SIGNIFICANT CHANGE UP (ref 10–60)
ALT FLD-CCNC: 32 U/L DA — SIGNIFICANT CHANGE UP (ref 10–60)
ANION GAP SERPL CALC-SCNC: 4 MMOL/L — LOW (ref 5–17)
ANION GAP SERPL CALC-SCNC: 4 MMOL/L — LOW (ref 5–17)
APPEARANCE UR: ABNORMAL
APPEARANCE UR: ABNORMAL
APTT BLD: 27.5 SEC — SIGNIFICANT CHANGE UP (ref 24.5–35.6)
APTT BLD: 27.5 SEC — SIGNIFICANT CHANGE UP (ref 24.5–35.6)
AST SERPL-CCNC: 20 U/L — SIGNIFICANT CHANGE UP (ref 10–40)
AST SERPL-CCNC: 20 U/L — SIGNIFICANT CHANGE UP (ref 10–40)
BACTERIA # UR AUTO: ABNORMAL /HPF
BACTERIA # UR AUTO: ABNORMAL /HPF
BASOPHILS # BLD AUTO: 0.04 K/UL — SIGNIFICANT CHANGE UP (ref 0–0.2)
BASOPHILS # BLD AUTO: 0.04 K/UL — SIGNIFICANT CHANGE UP (ref 0–0.2)
BASOPHILS NFR BLD AUTO: 0.5 % — SIGNIFICANT CHANGE UP (ref 0–2)
BASOPHILS NFR BLD AUTO: 0.5 % — SIGNIFICANT CHANGE UP (ref 0–2)
BILIRUB SERPL-MCNC: 0.5 MG/DL — SIGNIFICANT CHANGE UP (ref 0.2–1.2)
BILIRUB SERPL-MCNC: 0.5 MG/DL — SIGNIFICANT CHANGE UP (ref 0.2–1.2)
BILIRUB UR-MCNC: NEGATIVE — SIGNIFICANT CHANGE UP
BILIRUB UR-MCNC: NEGATIVE — SIGNIFICANT CHANGE UP
BLD GP AB SCN SERPL QL: SIGNIFICANT CHANGE UP
BLD GP AB SCN SERPL QL: SIGNIFICANT CHANGE UP
BUN SERPL-MCNC: 14 MG/DL — SIGNIFICANT CHANGE UP (ref 7–18)
BUN SERPL-MCNC: 14 MG/DL — SIGNIFICANT CHANGE UP (ref 7–18)
CALCIUM SERPL-MCNC: 9.1 MG/DL — SIGNIFICANT CHANGE UP (ref 8.4–10.5)
CALCIUM SERPL-MCNC: 9.1 MG/DL — SIGNIFICANT CHANGE UP (ref 8.4–10.5)
CHLORIDE SERPL-SCNC: 107 MMOL/L — SIGNIFICANT CHANGE UP (ref 96–108)
CHLORIDE SERPL-SCNC: 107 MMOL/L — SIGNIFICANT CHANGE UP (ref 96–108)
CK MB BLD-MCNC: <2.6 % — SIGNIFICANT CHANGE UP (ref 0–3.5)
CK MB BLD-MCNC: <2.6 % — SIGNIFICANT CHANGE UP (ref 0–3.5)
CK MB CFR SERPL CALC: <1 NG/ML — SIGNIFICANT CHANGE UP (ref 0–3.6)
CK MB CFR SERPL CALC: <1 NG/ML — SIGNIFICANT CHANGE UP (ref 0–3.6)
CK SERPL-CCNC: 38 U/L — SIGNIFICANT CHANGE UP (ref 21–215)
CK SERPL-CCNC: 38 U/L — SIGNIFICANT CHANGE UP (ref 21–215)
CO2 SERPL-SCNC: 27 MMOL/L — SIGNIFICANT CHANGE UP (ref 22–31)
CO2 SERPL-SCNC: 27 MMOL/L — SIGNIFICANT CHANGE UP (ref 22–31)
COLOR SPEC: YELLOW — SIGNIFICANT CHANGE UP
COLOR SPEC: YELLOW — SIGNIFICANT CHANGE UP
COMMENT - URINE: SIGNIFICANT CHANGE UP
COMMENT - URINE: SIGNIFICANT CHANGE UP
CREAT SERPL-MCNC: 1.14 MG/DL — SIGNIFICANT CHANGE UP (ref 0.5–1.3)
CREAT SERPL-MCNC: 1.14 MG/DL — SIGNIFICANT CHANGE UP (ref 0.5–1.3)
DIFF PNL FLD: ABNORMAL
DIFF PNL FLD: ABNORMAL
EGFR: 57 ML/MIN/1.73M2 — LOW
EGFR: 57 ML/MIN/1.73M2 — LOW
EOSINOPHIL # BLD AUTO: 0.06 K/UL — SIGNIFICANT CHANGE UP (ref 0–0.5)
EOSINOPHIL # BLD AUTO: 0.06 K/UL — SIGNIFICANT CHANGE UP (ref 0–0.5)
EOSINOPHIL NFR BLD AUTO: 0.8 % — SIGNIFICANT CHANGE UP (ref 0–6)
EOSINOPHIL NFR BLD AUTO: 0.8 % — SIGNIFICANT CHANGE UP (ref 0–6)
EPI CELLS # UR: PRESENT
EPI CELLS # UR: PRESENT
GLUCOSE SERPL-MCNC: 109 MG/DL — HIGH (ref 70–99)
GLUCOSE SERPL-MCNC: 109 MG/DL — HIGH (ref 70–99)
GLUCOSE UR QL: NEGATIVE MG/DL — SIGNIFICANT CHANGE UP
GLUCOSE UR QL: NEGATIVE MG/DL — SIGNIFICANT CHANGE UP
HCG UR QL: NEGATIVE — SIGNIFICANT CHANGE UP
HCG UR QL: NEGATIVE — SIGNIFICANT CHANGE UP
HCT VFR BLD CALC: 40.4 % — SIGNIFICANT CHANGE UP (ref 34.5–45)
HCT VFR BLD CALC: 40.4 % — SIGNIFICANT CHANGE UP (ref 34.5–45)
HGB BLD-MCNC: 13.6 G/DL — SIGNIFICANT CHANGE UP (ref 11.5–15.5)
HGB BLD-MCNC: 13.6 G/DL — SIGNIFICANT CHANGE UP (ref 11.5–15.5)
IMM GRANULOCYTES NFR BLD AUTO: 0.3 % — SIGNIFICANT CHANGE UP (ref 0–0.9)
IMM GRANULOCYTES NFR BLD AUTO: 0.3 % — SIGNIFICANT CHANGE UP (ref 0–0.9)
INR BLD: 1.07 RATIO — SIGNIFICANT CHANGE UP (ref 0.85–1.18)
INR BLD: 1.07 RATIO — SIGNIFICANT CHANGE UP (ref 0.85–1.18)
KETONES UR-MCNC: NEGATIVE MG/DL — SIGNIFICANT CHANGE UP
KETONES UR-MCNC: NEGATIVE MG/DL — SIGNIFICANT CHANGE UP
LEUKOCYTE ESTERASE UR-ACNC: NEGATIVE — SIGNIFICANT CHANGE UP
LEUKOCYTE ESTERASE UR-ACNC: NEGATIVE — SIGNIFICANT CHANGE UP
LYMPHOCYTES # BLD AUTO: 1.08 K/UL — SIGNIFICANT CHANGE UP (ref 1–3.3)
LYMPHOCYTES # BLD AUTO: 1.08 K/UL — SIGNIFICANT CHANGE UP (ref 1–3.3)
LYMPHOCYTES # BLD AUTO: 14.1 % — SIGNIFICANT CHANGE UP (ref 13–44)
LYMPHOCYTES # BLD AUTO: 14.1 % — SIGNIFICANT CHANGE UP (ref 13–44)
MCHC RBC-ENTMCNC: 29.2 PG — SIGNIFICANT CHANGE UP (ref 27–34)
MCHC RBC-ENTMCNC: 29.2 PG — SIGNIFICANT CHANGE UP (ref 27–34)
MCHC RBC-ENTMCNC: 33.7 GM/DL — SIGNIFICANT CHANGE UP (ref 32–36)
MCHC RBC-ENTMCNC: 33.7 GM/DL — SIGNIFICANT CHANGE UP (ref 32–36)
MCV RBC AUTO: 86.7 FL — SIGNIFICANT CHANGE UP (ref 80–100)
MCV RBC AUTO: 86.7 FL — SIGNIFICANT CHANGE UP (ref 80–100)
MONOCYTES # BLD AUTO: 0.53 K/UL — SIGNIFICANT CHANGE UP (ref 0–0.9)
MONOCYTES # BLD AUTO: 0.53 K/UL — SIGNIFICANT CHANGE UP (ref 0–0.9)
MONOCYTES NFR BLD AUTO: 6.9 % — SIGNIFICANT CHANGE UP (ref 2–14)
MONOCYTES NFR BLD AUTO: 6.9 % — SIGNIFICANT CHANGE UP (ref 2–14)
NEUTROPHILS # BLD AUTO: 5.93 K/UL — SIGNIFICANT CHANGE UP (ref 1.8–7.4)
NEUTROPHILS # BLD AUTO: 5.93 K/UL — SIGNIFICANT CHANGE UP (ref 1.8–7.4)
NEUTROPHILS NFR BLD AUTO: 77.4 % — HIGH (ref 43–77)
NEUTROPHILS NFR BLD AUTO: 77.4 % — HIGH (ref 43–77)
NITRITE UR-MCNC: NEGATIVE — SIGNIFICANT CHANGE UP
NITRITE UR-MCNC: NEGATIVE — SIGNIFICANT CHANGE UP
NRBC # BLD: 0 /100 WBCS — SIGNIFICANT CHANGE UP (ref 0–0)
NRBC # BLD: 0 /100 WBCS — SIGNIFICANT CHANGE UP (ref 0–0)
PH UR: 5.5 — SIGNIFICANT CHANGE UP (ref 5–8)
PH UR: 5.5 — SIGNIFICANT CHANGE UP (ref 5–8)
PLATELET # BLD AUTO: 184 K/UL — SIGNIFICANT CHANGE UP (ref 150–400)
PLATELET # BLD AUTO: 184 K/UL — SIGNIFICANT CHANGE UP (ref 150–400)
POTASSIUM SERPL-MCNC: 4.2 MMOL/L — SIGNIFICANT CHANGE UP (ref 3.5–5.3)
POTASSIUM SERPL-MCNC: 4.2 MMOL/L — SIGNIFICANT CHANGE UP (ref 3.5–5.3)
POTASSIUM SERPL-SCNC: 4.2 MMOL/L — SIGNIFICANT CHANGE UP (ref 3.5–5.3)
POTASSIUM SERPL-SCNC: 4.2 MMOL/L — SIGNIFICANT CHANGE UP (ref 3.5–5.3)
PROT SERPL-MCNC: 7.5 G/DL — SIGNIFICANT CHANGE UP (ref 6–8.3)
PROT SERPL-MCNC: 7.5 G/DL — SIGNIFICANT CHANGE UP (ref 6–8.3)
PROT UR-MCNC: ABNORMAL MG/DL
PROT UR-MCNC: ABNORMAL MG/DL
PROTHROM AB SERPL-ACNC: 12.2 SEC — SIGNIFICANT CHANGE UP (ref 9.5–13)
PROTHROM AB SERPL-ACNC: 12.2 SEC — SIGNIFICANT CHANGE UP (ref 9.5–13)
RBC # BLD: 4.66 M/UL — SIGNIFICANT CHANGE UP (ref 3.8–5.2)
RBC # BLD: 4.66 M/UL — SIGNIFICANT CHANGE UP (ref 3.8–5.2)
RBC # FLD: 11.9 % — SIGNIFICANT CHANGE UP (ref 10.3–14.5)
RBC # FLD: 11.9 % — SIGNIFICANT CHANGE UP (ref 10.3–14.5)
RBC CASTS # UR COMP ASSIST: 5 /HPF — HIGH (ref 0–4)
RBC CASTS # UR COMP ASSIST: 5 /HPF — HIGH (ref 0–4)
SODIUM SERPL-SCNC: 138 MMOL/L — SIGNIFICANT CHANGE UP (ref 135–145)
SODIUM SERPL-SCNC: 138 MMOL/L — SIGNIFICANT CHANGE UP (ref 135–145)
SP GR SPEC: 1.03 — SIGNIFICANT CHANGE UP (ref 1–1.03)
SP GR SPEC: 1.03 — SIGNIFICANT CHANGE UP (ref 1–1.03)
TROPONIN I, HIGH SENSITIVITY RESULT: 5.4 NG/L — SIGNIFICANT CHANGE UP
TROPONIN I, HIGH SENSITIVITY RESULT: 5.4 NG/L — SIGNIFICANT CHANGE UP
UROBILINOGEN FLD QL: 0.2 MG/DL — SIGNIFICANT CHANGE UP (ref 0.2–1)
UROBILINOGEN FLD QL: 0.2 MG/DL — SIGNIFICANT CHANGE UP (ref 0.2–1)
WBC # BLD: 7.66 K/UL — SIGNIFICANT CHANGE UP (ref 3.8–10.5)
WBC # BLD: 7.66 K/UL — SIGNIFICANT CHANGE UP (ref 3.8–10.5)
WBC # FLD AUTO: 7.66 K/UL — SIGNIFICANT CHANGE UP (ref 3.8–10.5)
WBC # FLD AUTO: 7.66 K/UL — SIGNIFICANT CHANGE UP (ref 3.8–10.5)
WBC UR QL: 5 /HPF — SIGNIFICANT CHANGE UP (ref 0–5)
WBC UR QL: 5 /HPF — SIGNIFICANT CHANGE UP (ref 0–5)

## 2024-01-02 PROCEDURE — 93975 VASCULAR STUDY: CPT | Mod: 26

## 2024-01-02 PROCEDURE — 76830 TRANSVAGINAL US NON-OB: CPT | Mod: 26

## 2024-01-02 PROCEDURE — 76856 US EXAM PELVIC COMPLETE: CPT | Mod: 26,59

## 2024-01-02 PROCEDURE — 99285 EMERGENCY DEPT VISIT HI MDM: CPT

## 2024-01-02 PROCEDURE — 74177 CT ABD & PELVIS W/CONTRAST: CPT | Mod: 26,MA

## 2024-01-02 RX ORDER — SODIUM CHLORIDE 9 MG/ML
1000 INJECTION INTRAMUSCULAR; INTRAVENOUS; SUBCUTANEOUS ONCE
Refills: 0 | Status: COMPLETED | OUTPATIENT
Start: 2024-01-02 | End: 2024-01-02

## 2024-01-02 RX ORDER — LOSARTAN POTASSIUM 100 MG/1
25 TABLET, FILM COATED ORAL ONCE
Refills: 0 | Status: COMPLETED | OUTPATIENT
Start: 2024-01-02 | End: 2024-01-02

## 2024-01-02 RX ORDER — ACETAMINOPHEN 500 MG
1000 TABLET ORAL ONCE
Refills: 0 | Status: COMPLETED | OUTPATIENT
Start: 2024-01-02 | End: 2024-01-03

## 2024-01-02 RX ORDER — CEFTRIAXONE 500 MG/1
1000 INJECTION, POWDER, FOR SOLUTION INTRAMUSCULAR; INTRAVENOUS ONCE
Refills: 0 | Status: COMPLETED | OUTPATIENT
Start: 2024-01-02 | End: 2024-01-02

## 2024-01-02 RX ORDER — ONDANSETRON 8 MG/1
4 TABLET, FILM COATED ORAL EVERY 8 HOURS
Refills: 0 | Status: DISCONTINUED | OUTPATIENT
Start: 2024-01-02 | End: 2024-01-04

## 2024-01-02 RX ORDER — LOSARTAN POTASSIUM 100 MG/1
25 TABLET, FILM COATED ORAL DAILY
Refills: 0 | Status: DISCONTINUED | OUTPATIENT
Start: 2024-01-02 | End: 2024-01-04

## 2024-01-02 RX ORDER — ACETAMINOPHEN 500 MG
1000 TABLET ORAL ONCE
Refills: 0 | Status: COMPLETED | OUTPATIENT
Start: 2024-01-02 | End: 2024-01-02

## 2024-01-02 RX ORDER — SODIUM CHLORIDE 9 MG/ML
1000 INJECTION INTRAMUSCULAR; INTRAVENOUS; SUBCUTANEOUS
Refills: 0 | Status: DISCONTINUED | OUTPATIENT
Start: 2024-01-02 | End: 2024-01-04

## 2024-01-02 RX ORDER — KETOROLAC TROMETHAMINE 30 MG/ML
15 SYRINGE (ML) INJECTION ONCE
Refills: 0 | Status: DISCONTINUED | OUTPATIENT
Start: 2024-01-02 | End: 2024-01-02

## 2024-01-02 RX ORDER — HYDROMORPHONE HYDROCHLORIDE 2 MG/ML
0.5 INJECTION INTRAMUSCULAR; INTRAVENOUS; SUBCUTANEOUS EVERY 4 HOURS
Refills: 0 | Status: DISCONTINUED | OUTPATIENT
Start: 2024-01-02 | End: 2024-01-03

## 2024-01-02 RX ADMIN — HYDROMORPHONE HYDROCHLORIDE 0.5 MILLIGRAM(S): 2 INJECTION INTRAMUSCULAR; INTRAVENOUS; SUBCUTANEOUS at 20:44

## 2024-01-02 RX ADMIN — CEFTRIAXONE 100 MILLIGRAM(S): 500 INJECTION, POWDER, FOR SOLUTION INTRAMUSCULAR; INTRAVENOUS at 19:01

## 2024-01-02 RX ADMIN — SODIUM CHLORIDE 110 MILLILITER(S): 9 INJECTION INTRAMUSCULAR; INTRAVENOUS; SUBCUTANEOUS at 19:02

## 2024-01-02 RX ADMIN — Medication 400 MILLIGRAM(S): at 15:51

## 2024-01-02 RX ADMIN — HYDROMORPHONE HYDROCHLORIDE 0.5 MILLIGRAM(S): 2 INJECTION INTRAMUSCULAR; INTRAVENOUS; SUBCUTANEOUS at 21:10

## 2024-01-02 RX ADMIN — Medication 15 MILLIGRAM(S): at 15:48

## 2024-01-02 RX ADMIN — SODIUM CHLORIDE 1000 MILLILITER(S): 9 INJECTION INTRAMUSCULAR; INTRAVENOUS; SUBCUTANEOUS at 14:47

## 2024-01-02 RX ADMIN — SODIUM CHLORIDE 110 MILLILITER(S): 9 INJECTION INTRAMUSCULAR; INTRAVENOUS; SUBCUTANEOUS at 20:45

## 2024-01-02 RX ADMIN — Medication 15 MILLIGRAM(S): at 14:48

## 2024-01-02 RX ADMIN — Medication 1000 MILLIGRAM(S): at 16:51

## 2024-01-02 RX ADMIN — LOSARTAN POTASSIUM 25 MILLIGRAM(S): 100 TABLET, FILM COATED ORAL at 22:04

## 2024-01-02 RX ADMIN — SODIUM CHLORIDE 1000 MILLILITER(S): 9 INJECTION INTRAMUSCULAR; INTRAVENOUS; SUBCUTANEOUS at 16:51

## 2024-01-02 NOTE — H&P ADULT - NSHPLABSRESULTS_GEN_ALL_CORE
13.6   7.66  )-----------( 184      ( 2024 14:06 )             40.4       138  |  107  |  14  ----------------------------<  109<H>  4.2   |  27  |  1.14    Ca    9.1      2024 14:06    TPro  7.5  /  Alb  3.8  /  TBili  0.5  /  DBili  x   /  AST  20  /  ALT  32  /  AlkPhos  131<H>  01-02  Urinalysis Basic - ( 2024 14:06 )    Color: Yellow / Appearance: Cloudy / S.026 / pH: x  Gluc: 109 mg/dL / Ketone: Negative mg/dL  / Bili: Negative / Urobili: 0.2 mg/dL   Blood: x / Protein: Trace mg/dL / Nitrite: Negative   Leuk Esterase: Negative / RBC: 5 /HPF / WBC 5 /HPF   Sq Epi: x / Non Sq Epi: x / Bacteria: Few /HPF    < from: CT Abdomen and Pelvis w/ IV Cont (24 @ 16:49) >      ACC: [id number] EXAM:  CT ABDOMEN AND PELVIS IC   ORDERED BY: MORGAN COLLETTA     PROCEDURE DATE:  2024          INTERPRETATION:  CLINICAL INFORMATION: 53 years  Female with rlq ttp.    COMPARISON: Chest CT 2019    CONTRAST/COMPLICATIONS:  IV Contrast: Omnipaque 350  90 cc administered   10 cc discarded  Oral Contrast: NONE  Complications: None reported at time of study completion    PROCEDURE:  CT of the Abdomen and Pelvis was performed.  Sagittal and coronal reformats were performed.    FINDINGS:  LOWER CHEST: Bibasilar dependent atelectasis.    LIVER: Steatosis. The left lobe again extends across the midline.  BILE DUCTS: Normal caliber.  GALLBLADDER: Cholecystectomy.  SPLEEN: Within normal limits.  PANCREAS: Within normal limits.  ADRENALS: Within normal limits.  KIDNEYS/URETERS: Marked right hydroureteronephrosis to the level of a 9 x   5 mm ureteral calculus at the pelvic inlet. Delayed right nephrogram   secondary to obstruction. Mild perinephric fat stranding.  No left hydroureteronephrosis or calculus.    BLADDER: Within normal limits.  REPRODUCTIVE ORGANS: Unremarkable uterus.    BOWEL: No bowel obstruction. Appendix is normal.  PERITONEUM: No ascites.  VESSELS: Within normal limits.  RETROPERITONEUM/LYMPH NODES: No lymphadenopathy.  ABDOMINAL WALL: Within normal limits.  BONES: Within normal limits.    IMPRESSION:  Marked right hydroureteronephrosis to the level of a 9 x 5 mm ureteral   calculus at the level of the pelvic inlet. Obstructive uropathy.        --- End of Report ---            BRAYAN NICHOLS MD; Attending Radiologist  This document has been electronically signed. 2024  4:59PM    < end of copied text > 13.6   7.66  )-----------( 184      ( 2024 14:06 )             40.4       138  |  107  |  14  ----------------------------<  109<H>  4.2   |  27  |  1.14    Ca    9.1      2024 14:06    TPro  7.5  /  Alb  3.8  /  TBili  0.5  /  DBili  x   /  AST  20  /  ALT  32  /  AlkPhos  131<H>  01-02  Urinalysis Basic - ( 2024 14:06 )    Color: Yellow / Appearance: Cloudy / S.026 / pH: x  Gluc: 109 mg/dL / Ketone: Negative mg/dL  / Bili: Negative / Urobili: 0.2 mg/dL   Blood: x / Protein: Trace mg/dL / Nitrite: Negative   Leuk Esterase: Negative / RBC: 5 /HPF / WBC 5 /HPF   Sq Epi: x / Non Sq Epi: x / Bacteria: Few /HPF    < from: CT Abdomen and Pelvis w/ IV Cont (24 @ 16:49) >      ACC: [id number] EXAM:  CT ABDOMEN AND PELVIS IC   ORDERED BY: MORGAN COLLETTA     PROCEDURE DATE:  2024          INTERPRETATION:  CLINICAL INFORMATION: 53 years  Female with rlq ttp.    COMPARISON: Chest CT 2019    CONTRAST/COMPLICATIONS:  IV Contrast: Omnipaque 350  90 cc administered   10 cc discarded  Oral Contrast: NONE  Complications: None reported at time of study completion    PROCEDURE:  CT of the Abdomen and Pelvis was performed.  Sagittal and coronal reformats were performed.    FINDINGS:  LOWER CHEST: Bibasilar dependent atelectasis.    LIVER: Steatosis. The left lobe again extends across the midline.  BILE DUCTS: Normal caliber.  GALLBLADDER: Cholecystectomy.  SPLEEN: Within normal limits.  PANCREAS: Within normal limits.  ADRENALS: Within normal limits.  KIDNEYS/URETERS: Marked right hydroureteronephrosis to the level of a 9 x   5 mm ureteral calculus at the pelvic inlet. Delayed right nephrogram   secondary to obstruction. Mild perinephric fat stranding.  No left hydroureteronephrosis or calculus.    BLADDER: Within normal limits.  REPRODUCTIVE ORGANS: Unremarkable uterus.    BOWEL: No bowel obstruction. Appendix is normal.  PERITONEUM: No ascites.  VESSELS: Within normal limits.  RETROPERITONEUM/LYMPH NODES: No lymphadenopathy.  ABDOMINAL WALL: Within normal limits.  BONES: Within normal limits.    IMPRESSION:  Marked right hydroureteronephrosis to the level of a 9 x 5 mm ureteral   calculus at the level of the pelvic inlet. Obstructive uropathy.        --- End of Report ---            BRAYAN NICHOLS MD; Attending Radiologist  This document has been electronically signed. 2024  4:59PM    < end of copied text >

## 2024-01-02 NOTE — CHART NOTE - NSCHARTNOTEFT_GEN_A_CORE
pt c/o epigastric discomfort  states has had this before when blood pressure elevated  family states had recent angiogram which was reportedly neg    pt hadn't taken her losartan today  SBP 160s    Vital Signs Last 24 Hrs  T(C): 36.8 (02 Jan 2024 20:50), Max: 37 (02 Jan 2024 17:30)  T(F): 98.3 (02 Jan 2024 20:50), Max: 98.6 (02 Jan 2024 17:30)  HR: 83 (02 Jan 2024 21:28) (77 - 88)  BP: 168/82 (02 Jan 2024 21:28) (143/84 - 168/82)  BP(mean): --  RR: 18 (02 Jan 2024 20:50) (18 - 18)  SpO2: 99% (02 Jan 2024 20:50) (95% - 99%)    Parameters below as of 02 Jan 2024 20:50  Patient On (Oxygen Delivery Method): room air      losartan ordered    pt feeling improved  EKG/enzymes neg

## 2024-01-02 NOTE — PATIENT PROFILE ADULT - FALL HARM RISK - RISK INTERVENTIONS
Assistance with ambulation/Communicate Fall Risk and Risk Factors to all staff, patient, and family/Reinforce activity limits and safety measures with patient and family/Visual Cue: Yellow wristband/Bed in lowest position, wheels locked, appropriate side rails in place/Call bell, personal items and telephone in reach/Instruct patient to call for assistance before getting out of bed or chair/Non-slip footwear when patient is out of bed/[place] to call system/Physically safe environment - no spills, clutter or unnecessary equipment/Purposeful Proactive Rounding/Room/bathroom lighting operational, light cord in reach Assistance with ambulation/Communicate Fall Risk and Risk Factors to all staff, patient, and family/Reinforce activity limits and safety measures with patient and family/Visual Cue: Yellow wristband/Bed in lowest position, wheels locked, appropriate side rails in place/Call bell, personal items and telephone in reach/Instruct patient to call for assistance before getting out of bed or chair/Non-slip footwear when patient is out of bed/[place] to call system/Physically safe environment - no spills, clutter or unnecessary equipment/Purposeful Proactive Rounding/Room/bathroom lighting operational, light cord in reach

## 2024-01-02 NOTE — H&P ADULT - ASSESSMENT
54 y/o female with 9x5mm right calculus at level of pelvic outlet with marked right hydro, WBC WNL, UA-, VSS, afebrile  -Admit to the urology service under Dr. Spencer for observation   -Clears, NPOmn for possible intervention cysto/stent placement tomorrow  -IVF hydration   -ABx   -Pain control  -Anti-emetic  -DVT ppx/OOB  -call pharmacy in AM   -Discussed with Dr. Spencer  52 y/o female with 9x5mm right calculus at level of pelvic outlet with marked right hydro, WBC WNL, UA-, VSS, afebrile  -Admit to the urology service under Dr. Spencer for observation   -Clears, NPOmn for possible intervention cysto/stent placement tomorrow  -IVF hydration   -ABx   -Pain control  -Anti-emetic  -DVT ppx/OOB  -call pharmacy in AM   -Discussed with Dr. Spencer  54 y/o female with 9x5mm right calculus at level of pelvic outlet with marked right hydro, WBC WNL, UA-, VSS, afebrile  -Admit to the urology service under Dr. Spencer for observation   -Clears, NPOmn for possible intervention cysto/stent placement tomorrow  -IVF hydration   -ABx   -Pain control  -Anti-emetic  -DVT ppx/OOB  -call pharmacy in AM for med rec on hypertensive meds, pt unsure, bp stable for now   -Discussed with Dr. Spencer

## 2024-01-02 NOTE — H&P ADULT - NSHPPHYSICALEXAM_GEN_ALL_CORE
General:  Appears stated age, well-groomed, no distress  Eyes: EOMI  HENT:  WNL, no JVD  Respirations: Unlabored  Abdomen: soft, non distended, mildly tender rlq, no rebound, no guarding, no peritonitis, no palpable hernia   back: right CVA tenderness  Extremities: no edema bilaterally  Skin: warm and dry  Musculoskeletal: no calf tenderness b/l   Neuro:  Alert, oriented to time, place and person   Psych: normal affect

## 2024-01-02 NOTE — ED PROVIDER NOTE - PHYSICAL EXAMINATION
Gen: NAD, AOx3, able to make needs known, non-toxic  Head: NCAT  HEENT: EOMI, oral mucosa moist, normal conjunctiva  Lung: CTAB, no respiratory distress, no wheezes/rhonchi/rales B/L, speaking in full sentences  CV: RRR, no murmurs  Abd:  +RLQ ttp, non distended, soft,  no guarding, + rt sided CVA tenderness  MSK: no visible deformities  Neuro: Appears non focal  Skin: Warm, well perfused, no rash  Psych: normal affect

## 2024-01-02 NOTE — H&P ADULT - HISTORY OF PRESENT ILLNESS
54 y/o female hx of HTN, kidney stones in the past that she passed on her own, presenting with two days of rlq pain and right flank pain. Admits to associated nausea with pain. Pt denies any fever/chills, vomiting, dysuria, hematuria

## 2024-01-02 NOTE — ED PROVIDER NOTE - CLINICAL SUMMARY MEDICAL DECISION MAKING FREE TEXT BOX
53-year-old female PMH HTN, kidney stones PSH cholecystectomy presenting to emergency department for right lower back pain radiating to right groin.  Patient states pain does not feel similar to prior kidney stone.  Took Motrin at 5 AM. Endorsing nausea, Denies chest pain, shortness of breath, urinary symptoms, vomiting, diarrhea, fever, rash, trauma, other complaint. PE as above, differential diagnoses including but not limited to appendicitis, MSK related pain, ovarian torsion, labs CTAP, US, analgesia, reassess, dispo accordingly 53-year-old female PMH HTN, kidney stones PSH cholecystectomy presenting to emergency department for right lower back pain radiating to right groin.  Patient states pain does not feel similar to prior kidney stone.  Took Motrin at 5 AM. Endorsing nausea, Denies chest pain, shortness of breath, urinary symptoms, vomiting, diarrhea, fever, rash, trauma, other complaint. PE as above, differential diagnoses including but not limited to pyelo, kidney stone, appendicitis, MSK related pain, ovarian torsion, labs CTAP, US, analgesia, reassess, dispo accordingly

## 2024-01-02 NOTE — ED ADULT NURSE NOTE - OBJECTIVE STATEMENT
Covering RN note: Pt presents to the ED with c/o right sided abdominal pain radiating to the back since last night. H/o kidney stones.

## 2024-01-02 NOTE — ED ADULT NURSE NOTE - NSFALLUNIVINTERV_ED_ALL_ED
Bed/Stretcher in lowest position, wheels locked, appropriate side rails in place/Call bell, personal items and telephone in reach/Instruct patient to call for assistance before getting out of bed/chair/stretcher/Non-slip footwear applied when patient is off stretcher/[place] to call system/Physically safe environment - no spills, clutter or unnecessary equipment/Purposeful proactive rounding/Room/bathroom lighting operational, light cord in reach Bed/Stretcher in lowest position, wheels locked, appropriate side rails in place/Call bell, personal items and telephone in reach/Instruct patient to call for assistance before getting out of bed/chair/stretcher/Non-slip footwear applied when patient is off stretcher/[place] to call system/Physically safe environment - no spills, clutter or unnecessary equipment/Purposeful proactive rounding/Room/bathroom lighting operational, light cord in reach

## 2024-01-02 NOTE — H&P ADULT - NS ATTEND AMEND GEN_ALL_CORE FT
Pt seen and examined. Agree with note as written above    - OR today for urgent ureteral stent placement

## 2024-01-03 ENCOUNTER — TRANSCRIPTION ENCOUNTER (OUTPATIENT)
Age: 54
End: 2024-01-03

## 2024-01-03 LAB
ANION GAP SERPL CALC-SCNC: 5 MMOL/L — SIGNIFICANT CHANGE UP (ref 5–17)
ANION GAP SERPL CALC-SCNC: 5 MMOL/L — SIGNIFICANT CHANGE UP (ref 5–17)
BUN SERPL-MCNC: 10 MG/DL — SIGNIFICANT CHANGE UP (ref 7–18)
BUN SERPL-MCNC: 10 MG/DL — SIGNIFICANT CHANGE UP (ref 7–18)
CALCIUM SERPL-MCNC: 8.8 MG/DL — SIGNIFICANT CHANGE UP (ref 8.4–10.5)
CALCIUM SERPL-MCNC: 8.8 MG/DL — SIGNIFICANT CHANGE UP (ref 8.4–10.5)
CHLORIDE SERPL-SCNC: 110 MMOL/L — HIGH (ref 96–108)
CHLORIDE SERPL-SCNC: 110 MMOL/L — HIGH (ref 96–108)
CO2 SERPL-SCNC: 24 MMOL/L — SIGNIFICANT CHANGE UP (ref 22–31)
CO2 SERPL-SCNC: 24 MMOL/L — SIGNIFICANT CHANGE UP (ref 22–31)
CREAT SERPL-MCNC: 1.08 MG/DL — SIGNIFICANT CHANGE UP (ref 0.5–1.3)
CREAT SERPL-MCNC: 1.08 MG/DL — SIGNIFICANT CHANGE UP (ref 0.5–1.3)
CULTURE RESULTS: SIGNIFICANT CHANGE UP
CULTURE RESULTS: SIGNIFICANT CHANGE UP
EGFR: 61 ML/MIN/1.73M2 — SIGNIFICANT CHANGE UP
EGFR: 61 ML/MIN/1.73M2 — SIGNIFICANT CHANGE UP
GLUCOSE SERPL-MCNC: 113 MG/DL — HIGH (ref 70–99)
GLUCOSE SERPL-MCNC: 113 MG/DL — HIGH (ref 70–99)
HCT VFR BLD CALC: 36.7 % — SIGNIFICANT CHANGE UP (ref 34.5–45)
HCT VFR BLD CALC: 36.7 % — SIGNIFICANT CHANGE UP (ref 34.5–45)
HGB BLD-MCNC: 12.6 G/DL — SIGNIFICANT CHANGE UP (ref 11.5–15.5)
HGB BLD-MCNC: 12.6 G/DL — SIGNIFICANT CHANGE UP (ref 11.5–15.5)
MCHC RBC-ENTMCNC: 29.4 PG — SIGNIFICANT CHANGE UP (ref 27–34)
MCHC RBC-ENTMCNC: 29.4 PG — SIGNIFICANT CHANGE UP (ref 27–34)
MCHC RBC-ENTMCNC: 34.3 GM/DL — SIGNIFICANT CHANGE UP (ref 32–36)
MCHC RBC-ENTMCNC: 34.3 GM/DL — SIGNIFICANT CHANGE UP (ref 32–36)
MCV RBC AUTO: 85.7 FL — SIGNIFICANT CHANGE UP (ref 80–100)
MCV RBC AUTO: 85.7 FL — SIGNIFICANT CHANGE UP (ref 80–100)
NRBC # BLD: 0 /100 WBCS — SIGNIFICANT CHANGE UP (ref 0–0)
NRBC # BLD: 0 /100 WBCS — SIGNIFICANT CHANGE UP (ref 0–0)
PLATELET # BLD AUTO: 180 K/UL — SIGNIFICANT CHANGE UP (ref 150–400)
PLATELET # BLD AUTO: 180 K/UL — SIGNIFICANT CHANGE UP (ref 150–400)
POTASSIUM SERPL-MCNC: 3.7 MMOL/L — SIGNIFICANT CHANGE UP (ref 3.5–5.3)
POTASSIUM SERPL-MCNC: 3.7 MMOL/L — SIGNIFICANT CHANGE UP (ref 3.5–5.3)
POTASSIUM SERPL-SCNC: 3.7 MMOL/L — SIGNIFICANT CHANGE UP (ref 3.5–5.3)
POTASSIUM SERPL-SCNC: 3.7 MMOL/L — SIGNIFICANT CHANGE UP (ref 3.5–5.3)
RBC # BLD: 4.28 M/UL — SIGNIFICANT CHANGE UP (ref 3.8–5.2)
RBC # BLD: 4.28 M/UL — SIGNIFICANT CHANGE UP (ref 3.8–5.2)
RBC # FLD: 11.9 % — SIGNIFICANT CHANGE UP (ref 10.3–14.5)
RBC # FLD: 11.9 % — SIGNIFICANT CHANGE UP (ref 10.3–14.5)
SODIUM SERPL-SCNC: 139 MMOL/L — SIGNIFICANT CHANGE UP (ref 135–145)
SODIUM SERPL-SCNC: 139 MMOL/L — SIGNIFICANT CHANGE UP (ref 135–145)
SPECIMEN SOURCE: SIGNIFICANT CHANGE UP
SPECIMEN SOURCE: SIGNIFICANT CHANGE UP
WBC # BLD: 5.07 K/UL — SIGNIFICANT CHANGE UP (ref 3.8–10.5)
WBC # BLD: 5.07 K/UL — SIGNIFICANT CHANGE UP (ref 3.8–10.5)
WBC # FLD AUTO: 5.07 K/UL — SIGNIFICANT CHANGE UP (ref 3.8–10.5)
WBC # FLD AUTO: 5.07 K/UL — SIGNIFICANT CHANGE UP (ref 3.8–10.5)

## 2024-01-03 PROCEDURE — 52332 CYSTOSCOPY AND TREATMENT: CPT | Mod: RT

## 2024-01-03 DEVICE — URETERAL STENT PERCUFLEX PLUS 6FR 24CM: Type: IMPLANTABLE DEVICE | Status: FUNCTIONAL

## 2024-01-03 DEVICE — URETERAL STENT PERCUFLEX PLUS 6FR 28CM: Type: IMPLANTABLE DEVICE | Status: FUNCTIONAL

## 2024-01-03 DEVICE — URETERAL CATH OPEN END FLEXI-TIP 5FR .038" X 70CM: Type: IMPLANTABLE DEVICE | Status: FUNCTIONAL

## 2024-01-03 DEVICE — GUIDEWIRE SENSOR DUAL-FLEX NITINOL STRAIGHT .035" X 150CM: Type: IMPLANTABLE DEVICE | Status: FUNCTIONAL

## 2024-01-03 RX ORDER — TAMSULOSIN HYDROCHLORIDE 0.4 MG/1
1 CAPSULE ORAL
Qty: 30 | Refills: 0
Start: 2024-01-03 | End: 2024-02-01

## 2024-01-03 RX ORDER — HEPARIN SODIUM 5000 [USP'U]/ML
5000 INJECTION INTRAVENOUS; SUBCUTANEOUS EVERY 12 HOURS
Refills: 0 | Status: DISCONTINUED | OUTPATIENT
Start: 2024-01-03 | End: 2024-01-04

## 2024-01-03 RX ORDER — FENTANYL CITRATE 50 UG/ML
25 INJECTION INTRAVENOUS
Refills: 0 | Status: DISCONTINUED | OUTPATIENT
Start: 2024-01-03 | End: 2024-01-03

## 2024-01-03 RX ORDER — ACETAMINOPHEN 500 MG
1000 TABLET ORAL ONCE
Refills: 0 | Status: COMPLETED | OUTPATIENT
Start: 2024-01-03 | End: 2024-01-03

## 2024-01-03 RX ORDER — TAMSULOSIN HYDROCHLORIDE 0.4 MG/1
0.4 CAPSULE ORAL AT BEDTIME
Refills: 0 | Status: DISCONTINUED | OUTPATIENT
Start: 2024-01-03 | End: 2024-01-04

## 2024-01-03 RX ORDER — FENTANYL CITRATE 50 UG/ML
50 INJECTION INTRAVENOUS
Refills: 0 | Status: DISCONTINUED | OUTPATIENT
Start: 2024-01-03 | End: 2024-01-03

## 2024-01-03 RX ORDER — LOSARTAN POTASSIUM 100 MG/1
1 TABLET, FILM COATED ORAL
Qty: 0 | Refills: 0 | DISCHARGE
Start: 2024-01-03

## 2024-01-03 RX ORDER — OXYCODONE HYDROCHLORIDE 5 MG/1
5 TABLET ORAL EVERY 6 HOURS
Refills: 0 | Status: DISCONTINUED | OUTPATIENT
Start: 2024-01-03 | End: 2024-01-04

## 2024-01-03 RX ORDER — ACETAMINOPHEN 500 MG
1000 TABLET ORAL EVERY 6 HOURS
Refills: 0 | Status: COMPLETED | OUTPATIENT
Start: 2024-01-03 | End: 2024-01-04

## 2024-01-03 RX ADMIN — TAMSULOSIN HYDROCHLORIDE 0.4 MILLIGRAM(S): 0.4 CAPSULE ORAL at 22:40

## 2024-01-03 RX ADMIN — Medication 400 MILLIGRAM(S): at 18:08

## 2024-01-03 RX ADMIN — Medication 400 MILLIGRAM(S): at 06:04

## 2024-01-03 RX ADMIN — Medication 400 MILLIGRAM(S): at 16:36

## 2024-01-03 RX ADMIN — Medication 1000 MILLIGRAM(S): at 19:01

## 2024-01-03 RX ADMIN — HEPARIN SODIUM 5000 UNIT(S): 5000 INJECTION INTRAVENOUS; SUBCUTANEOUS at 18:08

## 2024-01-03 RX ADMIN — Medication 400 MILLIGRAM(S): at 22:40

## 2024-01-03 RX ADMIN — Medication 1000 MILLIGRAM(S): at 17:36

## 2024-01-03 RX ADMIN — LOSARTAN POTASSIUM 25 MILLIGRAM(S): 100 TABLET, FILM COATED ORAL at 09:48

## 2024-01-03 RX ADMIN — Medication 1000 MILLIGRAM(S): at 23:00

## 2024-01-03 RX ADMIN — Medication 1000 MILLIGRAM(S): at 06:35

## 2024-01-03 RX ADMIN — SODIUM CHLORIDE 110 MILLILITER(S): 9 INJECTION INTRAMUSCULAR; INTRAVENOUS; SUBCUTANEOUS at 06:05

## 2024-01-03 NOTE — DISCHARGE NOTE PROVIDER - CARE PROVIDERS DIRECT ADDRESSES
,[email or web address]

## 2024-01-03 NOTE — PROGRESS NOTE ADULT - ASSESSMENT
52 y/o female with 9x5mm right calculus at level of pelvic outlet with marked right hydro, WBC WNL, UA-, VSS, afebrile    Plan:  - AM labs reviewed  - Discussed options of outpatient stone management vs inpatient stent, patient would like to proceed with stent placement  - Booked and consented for right ureteral stent placement   - NPO, IVF  - Pain control  - Anti-emetic  - DVT ppx/OOB    Discussed with Dr. Spencer    54 y/o female with 9x5mm right calculus at level of pelvic outlet with marked right hydro, WBC WNL, UA-, VSS, afebrile    Plan:  - AM labs reviewed  - Discussed options of outpatient stone management vs inpatient stent, patient would like to proceed with stent placement  - Booked and consented for right ureteral stent placement   - NPO, IVF  - Pain control  - Anti-emetic  - DVT ppx/OOB    Discussed with Dr. Spencer

## 2024-01-03 NOTE — DISCHARGE NOTE PROVIDER - PROVIDER TOKENS
Chief Complaint:   Chest Congestion (seen on 12/17, treated with doxy and Medrol) and Cough (productive clear sputum, some wheezing)      History of Present Illness   Source of history provided by: patient. Joe Arias is a 67 y.o. old female with a past medical history of:   Past Medical History:   Diagnosis Date    Adverse effect of anesthetic     difficulty waking up if lying flat post op    Arthritis     Cancer ([address]     breast left    Diverticula of colon 2011    Hyperlipidemia     Hypertension     Restless leg syndrome     Sleep apnea     CPAP    Thyroid disease    Presents to the walk in clinic for evaluation of a persistent cough x 4 weeks. Pt was initially seen for these symptoms through our walk in clinic on 12/17 and treated for acute bronchitis with doxycycline and a Medrol dose pack. Pt states her symptoms improved at that time but never completely resolved. Her cough is occasionally productive of clear sputum. Reports intermittent wheezing and mild SOB with coughing fits. Has been taking cough medicine OTC without relief. Denies any fever, chills, CP, or GI symptoms. Denies any hx of asthma or COPD, however she is noted to be on daily Spiriva. ROS    Unless otherwise stated in this report or unable to obtain because of the patient's clinical or mental status as evidenced by the medical record, this patients's positive and negative responses for Review of Systems, constitutional, psych, eyes, ENT, cardiovascular, respiratory, gastrointestinal, neurological, genitourinary, musculoskeletal, integument systems and systems related to the presenting problem are either stated in the preceding or were not pertinent or were negative for the symptoms and/or complaints related to the medical problem. Past Surgical History:  has a past surgical history that includes Appendectomy; Hand surgery; Hysterectomy; Knee arthroscopy (Bilateral); Lap Band (2007);  Ovary removal (1975); other pharyngeal erythema with mild post nasal drip present. No exudate or tonsillar hypertrophy noted. Neck:  Supple. There is no anterior cervical adenopathy. Lungs: Diffuse end-expiratory wheezes throughout. No rales or rhonchi. Cough is harsh and wheezy. Heart:  Regular rate and rhythm, normal heart sounds, without pathological murmurs, ectopy, gallops, or rubs. Skin:  Normal turgor. Warm, dry, without visible rash. Neurological:  Alert and oriented. Motor functions intact. Responds to verbal commands. Lab / Imaging Results   (All laboratory and radiology results have been personally reviewed by myself)  Labs:  No results found for this visit on 01/10/20. Assessment / Plan     Impression(s):  1. Bronchospasm    2. Persistent cough for 3 weeks or longer      Disposition:  Disposition: Discharge to home. Order given for OP CXR, will call with results once available. Pt reassured her symptoms are likely a result of her recent bronchitis episode and should resolve with time. Scripts written for a 12 day prednisone taper, Tessalon perles, and Proair, side effects discussed. Increase fluids and rest. Additional symptomatic relief discussed. F/u PCP in 5-7 days if symptoms persist. ED sooner if symptoms worsen or change. Red flag symptoms discussed. Pt is in agreement with this care plan. All questions answered. PROVIDER:[TOKEN:[[id number]],FOLLOWUP:[2 weeks]] PROVIDER:[TOKEN:[[id number]],FOLLOWUP:[2 weeks]]

## 2024-01-03 NOTE — DISCHARGE NOTE PROVIDER - CARE PROVIDER_API CALL
Zahra Spencer [facility]  Urology  [address] [place] [address]  Phone: [phone]  Fax: [phone]  Follow Up Time: 2 weeks   Zahra Spencer [facility]  Urology  [address], [address] [place] [address]  Phone: [phone]  Fax: [phone]  Follow Up Time: 2 weeks

## 2024-01-03 NOTE — DISCHARGE NOTE PROVIDER - HOSPITAL COURSE
54 y/o female with 9x5mm right calculus at level of pelvic outlet with marked right hydro. Discussed options of outpatient stone management vs inpatient stent, patient would like to proceed with stent placement. Now status post right ureteral stent placement 1/3.     At the time of discharge, the patient was hemodynamically stable, was tolerating PO diet, was voiding urine, was ambulating, and was comfortable with adequate pain control. The patient was instructed to follow up with Dr. Spencer within 1-2 weeks after discharge from the hospital. The patient felt comfortable with discharge. The patient was discharged to home. The patient had no other issues.   52 y/o female with 9x5mm right calculus at level of pelvic outlet with marked right hydro. Discussed options of outpatient stone management vs inpatient stent, patient would like to proceed with stent placement. Now status post right ureteral stent placement 1/3.     At the time of discharge, the patient was hemodynamically stable, was tolerating PO diet, was voiding urine, was ambulating, and was comfortable with adequate pain control. The patient was instructed to follow up with Dr. Spencer within 1-2 weeks after discharge from the hospital. The patient felt comfortable with discharge. The patient was discharged to home. The patient had no other issues.

## 2024-01-03 NOTE — PROVIDER CONTACT NOTE (CHANGE IN STATUS NOTIFICATION) - SITUATION
Patient c/o chest pain. As per patient, she had this pain before when BP is elevated. BP elevated, discussed with Dr. Gould. Within minutes, c/o pain was worsening. Seen by Dr. Gould. Pain improved.

## 2024-01-03 NOTE — DISCHARGE NOTE PROVIDER - NSDCMRMEDTOKEN_GEN_ALL_CORE_FT
Anusol-HC 2.5% topical cream: Apply topically to affected area 2 times a day   Colace 100 mg oral capsule: 1 cap(s) orally 2 times a day  Cozaar 25 mg oral tablet: 1 tab(s) orally once a day  gabapentin 300 mg oral capsule: 1 cap(s) orally once a day for 3 days, then twice a day for 3 days then three times a day   mg oral tablet: 1 tab(s) orally every 8 hours - for moderate pain  Lidocaine with Tegaderm 4% topical kit: Apply topically to affected area 3 times a day   lidocaine-hydrocortisone 3%-2.5% rectal gel with applicator: 1 applicatorful rectally once a day   oxyCODONE-acetaminophen 5 mg-325 mg oral tablet: 1 tab(s) orally every 8 hours, As Needed for severe pain. May cause sedation. MDD:3 tabs/24 hrs  oxycodone-acetaminophen 5 mg-325 mg oral tablet: 1 tab(s) orally every 6 hours MDD:4  Proventil HFA 90 mcg/inh inhalation aerosol: 2 puff(s) inhaled every 4 to 6 hours x 7 days, As Needed  tamsulosin 0.4 mg oral capsule: 1 cap(s) orally once a day (at bedtime)

## 2024-01-03 NOTE — DISCHARGE NOTE PROVIDER - NSDCCPCAREPLAN_GEN_ALL_CORE_FT
PRINCIPAL DISCHARGE DIAGNOSIS  Diagnosis: Kidney stone  Assessment and Plan of Treatment: It is common to have blood in the urine after your procedure.  It may be pink or even red; inform your doctor if you have a significant amount of clots in the urine or if you are unable to void at all.  -It is not uncommon to have some burning when you urinate, this will improve over the next few days.  -You have an internal stent (a hollow tube that runs from the kidney to your bladder) after your procedure, helping your kidney drain down to your bladder after your surgery.  Some patients do not notice that they have a stent, while others complain of the sensation of needing to urinate frequently, burning on urination, or even some back pain (especially when they go to urinate). These sensations usually improve gradually, some faster than others. This is not uncommon, but may initially warrant the use of the pain medication which you were prescribed.  While the stent is in place, your urine may continue to be bloody. This stent is temporary and must be removed/exchanged by your urologist.  -Provided that you are not restricted with fluids by your physician, you should drink 6-8 (8 oz.) glasses of fluid per day.  -You may resume your regular diet and regular medication regimen.    -You may shower or bathe.    -You may take over the counter pain medications such as Motrin and Tylenol as needed for pain.  Do not exceed 4 grams of Tylenol daily.  Each medication may be taken every 6 hours.   - Follow up with Dr. Spencer for definitive stone treatment

## 2024-01-04 ENCOUNTER — TRANSCRIPTION ENCOUNTER (OUTPATIENT)
Age: 54
End: 2024-01-04

## 2024-01-04 VITALS
TEMPERATURE: 98 F | SYSTOLIC BLOOD PRESSURE: 116 MMHG | OXYGEN SATURATION: 97 % | DIASTOLIC BLOOD PRESSURE: 75 MMHG | HEART RATE: 77 BPM | RESPIRATION RATE: 18 BRPM

## 2024-01-04 LAB
ANION GAP SERPL CALC-SCNC: 5 MMOL/L — SIGNIFICANT CHANGE UP (ref 5–17)
ANION GAP SERPL CALC-SCNC: 5 MMOL/L — SIGNIFICANT CHANGE UP (ref 5–17)
BUN SERPL-MCNC: 11 MG/DL — SIGNIFICANT CHANGE UP (ref 7–18)
BUN SERPL-MCNC: 11 MG/DL — SIGNIFICANT CHANGE UP (ref 7–18)
CALCIUM SERPL-MCNC: 8.8 MG/DL — SIGNIFICANT CHANGE UP (ref 8.4–10.5)
CALCIUM SERPL-MCNC: 8.8 MG/DL — SIGNIFICANT CHANGE UP (ref 8.4–10.5)
CHLORIDE SERPL-SCNC: 110 MMOL/L — HIGH (ref 96–108)
CHLORIDE SERPL-SCNC: 110 MMOL/L — HIGH (ref 96–108)
CO2 SERPL-SCNC: 25 MMOL/L — SIGNIFICANT CHANGE UP (ref 22–31)
CO2 SERPL-SCNC: 25 MMOL/L — SIGNIFICANT CHANGE UP (ref 22–31)
CREAT SERPL-MCNC: 1.05 MG/DL — SIGNIFICANT CHANGE UP (ref 0.5–1.3)
CREAT SERPL-MCNC: 1.05 MG/DL — SIGNIFICANT CHANGE UP (ref 0.5–1.3)
EGFR: 63 ML/MIN/1.73M2 — SIGNIFICANT CHANGE UP
EGFR: 63 ML/MIN/1.73M2 — SIGNIFICANT CHANGE UP
GLUCOSE SERPL-MCNC: 153 MG/DL — HIGH (ref 70–99)
GLUCOSE SERPL-MCNC: 153 MG/DL — HIGH (ref 70–99)
HCT VFR BLD CALC: 36.7 % — SIGNIFICANT CHANGE UP (ref 34.5–45)
HCT VFR BLD CALC: 36.7 % — SIGNIFICANT CHANGE UP (ref 34.5–45)
HGB BLD-MCNC: 12.3 G/DL — SIGNIFICANT CHANGE UP (ref 11.5–15.5)
HGB BLD-MCNC: 12.3 G/DL — SIGNIFICANT CHANGE UP (ref 11.5–15.5)
MCHC RBC-ENTMCNC: 29.6 PG — SIGNIFICANT CHANGE UP (ref 27–34)
MCHC RBC-ENTMCNC: 29.6 PG — SIGNIFICANT CHANGE UP (ref 27–34)
MCHC RBC-ENTMCNC: 33.5 GM/DL — SIGNIFICANT CHANGE UP (ref 32–36)
MCHC RBC-ENTMCNC: 33.5 GM/DL — SIGNIFICANT CHANGE UP (ref 32–36)
MCV RBC AUTO: 88.2 FL — SIGNIFICANT CHANGE UP (ref 80–100)
MCV RBC AUTO: 88.2 FL — SIGNIFICANT CHANGE UP (ref 80–100)
NRBC # BLD: 0 /100 WBCS — SIGNIFICANT CHANGE UP (ref 0–0)
NRBC # BLD: 0 /100 WBCS — SIGNIFICANT CHANGE UP (ref 0–0)
PLATELET # BLD AUTO: 190 K/UL — SIGNIFICANT CHANGE UP (ref 150–400)
PLATELET # BLD AUTO: 190 K/UL — SIGNIFICANT CHANGE UP (ref 150–400)
POTASSIUM SERPL-MCNC: 3.8 MMOL/L — SIGNIFICANT CHANGE UP (ref 3.5–5.3)
POTASSIUM SERPL-MCNC: 3.8 MMOL/L — SIGNIFICANT CHANGE UP (ref 3.5–5.3)
POTASSIUM SERPL-SCNC: 3.8 MMOL/L — SIGNIFICANT CHANGE UP (ref 3.5–5.3)
POTASSIUM SERPL-SCNC: 3.8 MMOL/L — SIGNIFICANT CHANGE UP (ref 3.5–5.3)
RBC # BLD: 4.16 M/UL — SIGNIFICANT CHANGE UP (ref 3.8–5.2)
RBC # BLD: 4.16 M/UL — SIGNIFICANT CHANGE UP (ref 3.8–5.2)
RBC # FLD: 11.6 % — SIGNIFICANT CHANGE UP (ref 10.3–14.5)
RBC # FLD: 11.6 % — SIGNIFICANT CHANGE UP (ref 10.3–14.5)
SODIUM SERPL-SCNC: 140 MMOL/L — SIGNIFICANT CHANGE UP (ref 135–145)
SODIUM SERPL-SCNC: 140 MMOL/L — SIGNIFICANT CHANGE UP (ref 135–145)
WBC # BLD: 3.4 K/UL — LOW (ref 3.8–10.5)
WBC # BLD: 3.4 K/UL — LOW (ref 3.8–10.5)
WBC # FLD AUTO: 3.4 K/UL — LOW (ref 3.8–10.5)
WBC # FLD AUTO: 3.4 K/UL — LOW (ref 3.8–10.5)

## 2024-01-04 PROCEDURE — 76000 FLUOROSCOPY <1 HR PHYS/QHP: CPT

## 2024-01-04 PROCEDURE — 76856 US EXAM PELVIC COMPLETE: CPT

## 2024-01-04 PROCEDURE — 81001 URINALYSIS AUTO W/SCOPE: CPT

## 2024-01-04 PROCEDURE — C2617: CPT

## 2024-01-04 PROCEDURE — 85610 PROTHROMBIN TIME: CPT

## 2024-01-04 PROCEDURE — 93005 ELECTROCARDIOGRAM TRACING: CPT

## 2024-01-04 PROCEDURE — 99285 EMERGENCY DEPT VISIT HI MDM: CPT | Mod: 25

## 2024-01-04 PROCEDURE — 74177 CT ABD & PELVIS W/CONTRAST: CPT | Mod: MA

## 2024-01-04 PROCEDURE — C1769: CPT

## 2024-01-04 PROCEDURE — 86850 RBC ANTIBODY SCREEN: CPT

## 2024-01-04 PROCEDURE — 85025 COMPLETE CBC W/AUTO DIFF WBC: CPT

## 2024-01-04 PROCEDURE — 82550 ASSAY OF CK (CPK): CPT

## 2024-01-04 PROCEDURE — 81025 URINE PREGNANCY TEST: CPT

## 2024-01-04 PROCEDURE — 96365 THER/PROPH/DIAG IV INF INIT: CPT

## 2024-01-04 PROCEDURE — 80053 COMPREHEN METABOLIC PANEL: CPT

## 2024-01-04 PROCEDURE — 85730 THROMBOPLASTIN TIME PARTIAL: CPT

## 2024-01-04 PROCEDURE — 36415 COLL VENOUS BLD VENIPUNCTURE: CPT

## 2024-01-04 PROCEDURE — 96375 TX/PRO/DX INJ NEW DRUG ADDON: CPT

## 2024-01-04 PROCEDURE — 82553 CREATINE MB FRACTION: CPT

## 2024-01-04 PROCEDURE — 85027 COMPLETE CBC AUTOMATED: CPT

## 2024-01-04 PROCEDURE — 93975 VASCULAR STUDY: CPT

## 2024-01-04 PROCEDURE — 76830 TRANSVAGINAL US NON-OB: CPT

## 2024-01-04 PROCEDURE — 86900 BLOOD TYPING SEROLOGIC ABO: CPT

## 2024-01-04 PROCEDURE — C1758: CPT

## 2024-01-04 PROCEDURE — 80048 BASIC METABOLIC PNL TOTAL CA: CPT

## 2024-01-04 PROCEDURE — 86901 BLOOD TYPING SEROLOGIC RH(D): CPT

## 2024-01-04 PROCEDURE — 87086 URINE CULTURE/COLONY COUNT: CPT

## 2024-01-04 PROCEDURE — 84484 ASSAY OF TROPONIN QUANT: CPT

## 2024-01-04 RX ORDER — PHENAZOPYRIDINE HCL 100 MG
100 TABLET ORAL EVERY 8 HOURS
Refills: 0 | Status: DISCONTINUED | OUTPATIENT
Start: 2024-01-04 | End: 2024-01-04

## 2024-01-04 RX ADMIN — LOSARTAN POTASSIUM 25 MILLIGRAM(S): 100 TABLET, FILM COATED ORAL at 05:27

## 2024-01-04 RX ADMIN — Medication 100 MILLIGRAM(S): at 13:42

## 2024-01-04 RX ADMIN — Medication 1000 MILLIGRAM(S): at 05:47

## 2024-01-04 RX ADMIN — HEPARIN SODIUM 5000 UNIT(S): 5000 INJECTION INTRAVENOUS; SUBCUTANEOUS at 05:26

## 2024-01-04 RX ADMIN — Medication 400 MILLIGRAM(S): at 05:27

## 2024-01-04 RX ADMIN — OXYCODONE HYDROCHLORIDE 5 MILLIGRAM(S): 5 TABLET ORAL at 16:31

## 2024-01-04 NOTE — DISCHARGE NOTE NURSING/CASE MANAGEMENT/SOCIAL WORK - PATIENT PORTAL LINK FT
You can access the FollowMyHealth Patient Portal offered by [facility] by registering at the following website: [email or web address]. By joining [employer]’s FollowMyHealth portal, you will also be able to view your health information using other applications (apps) compatible with our system. You can access the FollowMyHealth Patient Portal offered by [facility] by registering at the following website: [email or web address]. By joining [facility]’s FollowMyHealth portal, you will also be able to view your health information using other applications (apps) compatible with our system.

## 2024-01-04 NOTE — DISCHARGE NOTE NURSING/CASE MANAGEMENT/SOCIAL WORK - NSDCPEFALRISK_GEN_ALL_CORE
For information on Fall & Injury Prevention, visit: [email or web address] OR  [email or web address] OR  https://www.cdc.gov/steadi/patient.html For information on Fall & Injury Prevention, visit: [email or web address] OR  [email or web address]-injury OR  https://www.cdc.gov/steadi/patient.html

## 2024-01-04 NOTE — PROGRESS NOTE ADULT - SUBJECTIVE AND OBJECTIVE BOX
Subjective  Denies fevers, chills, nausea, vomiting. Pain well controlled on pain medications.     Objective    Vital signs  T(F): , Max: 98.6 (01-02-24 @ 17:30)  HR: 85 (01-03-24 @ 06:00)  BP: 118/78 (01-03-24 @ 06:00)  SpO2: 97% (01-03-24 @ 06:00)  Wt(kg): --    Output       Gen: NAD  Abd: soft, nontender, nondistended  : voiding without issues     Labs      01-03 @ 08:35    WBC 5.07  / Hct 36.7  / SCr --       01-02 @ 14:06    WBC 7.66  / Hct 40.4  / SCr 1.14           Imaging
S: No acute complaints. C/o burning in groin.     O:  Vital Signs Last 24 Hrs  T(C): 36.7 (04 Jan 2024 05:18), Max: 37.1 (03 Jan 2024 12:25)  T(F): 98.1 (04 Jan 2024 05:18), Max: 98.7 (03 Jan 2024 12:25)  HR: 77 (04 Jan 2024 05:18) (74 - 99)  BP: 112/69 (04 Jan 2024 05:18) (112/69 - 145/85)  BP(mean): 95 (03 Jan 2024 22:15) (91 - 97)  RR: 17 (04 Jan 2024 05:18) (13 - 17)  SpO2: 95% (04 Jan 2024 05:18) (94% - 97%)    Parameters below as of 04 Jan 2024 05:18  Patient On (Oxygen Delivery Method): room air    Exam:   General: awake, alert,, NAD  Resp: nonlabored  Abdomen: soft, tender in right groin  Back: no CVA tenderness

## 2024-01-04 NOTE — PROGRESS NOTE ADULT - REASON FOR ADMISSION
9x5mm obstructing ureteral stone
Patient needs referral to IR for biopsy of thyroid nodule that is 4cm in size
9x5mm obstructing ureteral stone

## 2024-01-22 ENCOUNTER — APPOINTMENT (OUTPATIENT)
Age: 54
End: 2024-01-22
Payer: MEDICAID

## 2024-01-22 VITALS
WEIGHT: 152 LBS | HEIGHT: 64 IN | TEMPERATURE: 98.7 F | OXYGEN SATURATION: 99 % | BODY MASS INDEX: 25.95 KG/M2 | HEART RATE: 80 BPM | DIASTOLIC BLOOD PRESSURE: 84 MMHG | SYSTOLIC BLOOD PRESSURE: 128 MMHG

## 2024-01-22 PROCEDURE — 99214 OFFICE O/P EST MOD 30 MIN: CPT

## 2024-01-22 RX ORDER — TAMSULOSIN HYDROCHLORIDE 0.4 MG/1
0.4 CAPSULE ORAL
Qty: 30 | Refills: 3 | Status: ACTIVE | COMMUNITY
Start: 2024-01-22 | End: 1900-01-01

## 2024-01-22 NOTE — PHYSICAL EXAM
[Normal Appearance] : normal appearance [Well Groomed] : well groomed [General Appearance - In No Acute Distress] : no acute distress [Edema] : no peripheral edema [Respiration, Rhythm And Depth] : normal respiratory rhythm and effort [Exaggerated Use Of Accessory Muscles For Inspiration] : no accessory muscle use [Abdomen Soft] : soft [Abdomen Tenderness] : non-tender [Urinary Bladder Findings] : the bladder was normal on palpation [Costovertebral Angle Tenderness] : no ~M costovertebral angle tenderness [Normal Station and Gait] : the gait and station were normal for the patient's age [] : no rash [No Focal Deficits] : no focal deficits [Oriented To Time, Place, And Person] : oriented to person, place, and time [Affect] : the affect was normal [Mood] : the mood was normal [No Palpable Adenopathy] : no palpable adenopathy

## 2024-01-22 NOTE — ASSESSMENT
[FreeTextEntry1] : 52 yo F with rignt nephrolithiasis  -I discussed the different treatment modalities for nephrolithiasis with the patient, including medical management, spontaneous stone passage, percutaneous stone extraction, extracorporeal shock wave lithotripsy, and ureteroscopy with laser lithotripsy and stone extraction. Given the size and location of the stone, I recommend and the patient opted to proceed with ureteroscopy. The risks, benefits, and alternatives to ureteroscopy were discussed with the patient, including but not limited to pain, infection, bleeding, bladder injury, ureteral injury, renal injury, and treatment failure. I also discussed the possible need for a temporary ureteral stent. I discussed the possible side effects of a temporary ureteral stent, including flank pain, hematuria, and bladder spasms. The patient understands that a stent is a temporary implant that must be removed in the future. The patient wishes to proceed and we will schedule the surgery for the near future. - UA, culture - Oxybutynin for bladder spasms

## 2024-01-22 NOTE — HISTORY OF PRESENT ILLNESS
[FreeTextEntry1] : 54 yo F presented with history of nephrolithaisis s/p ESWL many years ago Presented to ER with severe flank pain and found to have a 9mm right mid ureteral stone s/p urgent stent placement and here to discuss definitive stone management Having urinary urgency and frequency intermittent gross hematuria No fever, chills Some nausea

## 2024-01-23 LAB
APPEARANCE: ABNORMAL
BACTERIA: NEGATIVE /HPF
BILIRUBIN URINE: NEGATIVE
BLOOD URINE: ABNORMAL
CAST: NORMAL /LPF
COLOR: YELLOW
EPITHELIAL CELLS: 1 /HPF
GLUCOSE QUALITATIVE U: NEGATIVE MG/DL
KETONES URINE: NEGATIVE MG/DL
LEUKOCYTE ESTERASE URINE: ABNORMAL
MICROSCOPIC-UA: NORMAL
NITRITE URINE: NEGATIVE
PH URINE: 6.5
PROTEIN URINE: 100 MG/DL
RED BLOOD CELLS URINE: 525 /HPF
SPECIFIC GRAVITY URINE: 1.02
UROBILINOGEN URINE: 1 MG/DL
WHITE BLOOD CELLS URINE: 3 /HPF

## 2024-01-25 LAB — BACTERIA UR CULT: NORMAL

## 2024-02-02 ENCOUNTER — APPOINTMENT (OUTPATIENT)
Dept: FAMILY MEDICINE | Facility: CLINIC | Age: 54
End: 2024-02-02
Payer: MEDICAID

## 2024-02-02 VITALS
OXYGEN SATURATION: 96 % | BODY MASS INDEX: 26.46 KG/M2 | DIASTOLIC BLOOD PRESSURE: 72 MMHG | SYSTOLIC BLOOD PRESSURE: 115 MMHG | WEIGHT: 155 LBS | HEIGHT: 64 IN | HEART RATE: 75 BPM | TEMPERATURE: 98 F

## 2024-02-02 DIAGNOSIS — Z82.3 FAMILY HISTORY OF STROKE: ICD-10-CM

## 2024-02-02 DIAGNOSIS — I10 ESSENTIAL (PRIMARY) HYPERTENSION: ICD-10-CM

## 2024-02-02 DIAGNOSIS — Z01.818 ENCOUNTER FOR OTHER PREPROCEDURAL EXAMINATION: ICD-10-CM

## 2024-02-02 DIAGNOSIS — Z80.9 FAMILY HISTORY OF MALIGNANT NEOPLASM, UNSPECIFIED: ICD-10-CM

## 2024-02-02 PROCEDURE — 99204 OFFICE O/P NEW MOD 45 MIN: CPT | Mod: 25

## 2024-02-02 RX ORDER — LOSARTAN POTASSIUM 25 MG/1
25 TABLET, FILM COATED ORAL DAILY
Qty: 1 | Refills: 0 | Status: ACTIVE | COMMUNITY

## 2024-02-02 RX ORDER — POLYETHYLENE GLYCOL 3350, SODIUM SULFATE, SODIUM CHLORIDE, POTASSIUM CHLORIDE, ASCORBIC ACID, SODIUM ASCORBATE 140-9-5.2G
140 KIT ORAL
Qty: 1 | Refills: 0 | Status: DISCONTINUED | COMMUNITY
Start: 2021-05-04 | End: 2024-02-02

## 2024-02-02 RX ORDER — HYDROCORTISONE 2.5% 25 MG/G
2.5 CREAM TOPICAL TWICE DAILY
Qty: 1 | Refills: 1 | Status: DISCONTINUED | COMMUNITY
Start: 2021-05-04 | End: 2024-02-02

## 2024-02-02 NOTE — PHYSICAL EXAM
[No Acute Distress] : no acute distress [Well Nourished] : well nourished [Well Developed] : well developed [Well-Appearing] : well-appearing [Normal Sclera/Conjunctiva] : normal sclera/conjunctiva [EOMI] : extraocular movements intact [Normal Outer Ear/Nose] : the outer ears and nose were normal in appearance [Normal Nasal Mucosa] : the nasal mucosa was normal [No Lymphadenopathy] : no lymphadenopathy [Supple] : supple [No Respiratory Distress] : no respiratory distress  [No Accessory Muscle Use] : no accessory muscle use [Clear to Auscultation] : lungs were clear to auscultation bilaterally [Normal Rate] : normal rate  [Regular Rhythm] : with a regular rhythm [Normal S1, S2] : normal S1 and S2 [No Murmur] : no murmur heard [No Edema] : there was no peripheral edema [Soft] : abdomen soft [Non Tender] : non-tender [Non-distended] : non-distended [Normal Bowel Sounds] : normal bowel sounds [Normal Posterior Cervical Nodes] : no posterior cervical lymphadenopathy [Normal Anterior Cervical Nodes] : no anterior cervical lymphadenopathy [No CVA Tenderness] : no CVA  tenderness [No Spinal Tenderness] : no spinal tenderness [No Joint Swelling] : no joint swelling [Grossly Normal Strength/Tone] : grossly normal strength/tone [Coordination Grossly Intact] : coordination grossly intact [Normal Affect] : the affect was normal [Normal Insight/Judgement] : insight and judgment were intact

## 2024-02-05 LAB
ALBUMIN SERPL ELPH-MCNC: 4.1 G/DL
ALP BLD-CCNC: 118 U/L
ALT SERPL-CCNC: 19 U/L
ANION GAP SERPL CALC-SCNC: 12 MMOL/L
AST SERPL-CCNC: 19 U/L
BILIRUB SERPL-MCNC: 0.3 MG/DL
BUN SERPL-MCNC: 12 MG/DL
CALCIUM SERPL-MCNC: 9.1 MG/DL
CHLORIDE SERPL-SCNC: 109 MMOL/L
CO2 SERPL-SCNC: 21 MMOL/L
CREAT SERPL-MCNC: 0.83 MG/DL
EGFR: 84 ML/MIN/1.73M2
ESTIMATED AVERAGE GLUCOSE: 111 MG/DL
GLUCOSE SERPL-MCNC: 104 MG/DL
HBA1C MFR BLD HPLC: 5.5 %
HCT VFR BLD CALC: 38.9 %
HGB BLD-MCNC: 12.8 G/DL
MCHC RBC-ENTMCNC: 29.4 PG
MCHC RBC-ENTMCNC: 32.9 GM/DL
MCV RBC AUTO: 89.4 FL
PLATELET # BLD AUTO: 260 K/UL
POTASSIUM SERPL-SCNC: 4.3 MMOL/L
PROT SERPL-MCNC: 7 G/DL
RBC # BLD: 4.35 M/UL
RBC # FLD: 12.1 %
SODIUM SERPL-SCNC: 142 MMOL/L
WBC # FLD AUTO: 4.22 K/UL

## 2024-02-05 NOTE — REVIEW OF SYSTEMS
[Frequency] : frequency [Fever] : no fever [Chills] : no chills [Pain] : no pain [Vision Problems] : no vision problems [Nasal Discharge] : no nasal discharge [Sore Throat] : no sore throat [Chest Pain] : no chest pain [Palpitations] : no palpitations [Leg Claudication] : no leg claudication [Lower Ext Edema] : no lower extremity edema [Orthopnea] : no orthopnea [Paroxysmal Nocturnal Dyspnea] : no paroxysmal nocturnal dyspnea [Shortness Of Breath] : no shortness of breath [Wheezing] : no wheezing [Cough] : no cough [Dyspnea on Exertion] : no dyspnea on exertion [Abdominal Pain] : no abdominal pain [Constipation] : no constipation [Diarrhea] : diarrhea [Vomiting] : no vomiting [Melena] : no melena [Dysuria] : no dysuria [Hematuria] : no hematuria [Muscle Weakness] : no muscle weakness [Muscle Pain] : no muscle pain [Itching] : no itching [Skin Rash] : no skin rash [Headache] : no headache [Dizziness] : no dizziness [Easy Bleeding] : no easy bleeding [Easy Bruising] : no easy bruising

## 2024-02-05 NOTE — HISTORY OF PRESENT ILLNESS
[No Adverse Anesthesia Reaction] : no adverse anesthesia reaction in self or family member [(Patient denies any chest pain, claudication, dyspnea on exertion, orthopnea, palpitations or syncope)] : Patient denies any chest pain, claudication, dyspnea on exertion, orthopnea, palpitations or syncope [____ METs%] : [unfilled] METs% [Aortic Stenosis] : no aortic stenosis [Atrial Fibrillation] : no atrial fibrillation [Recent Myocardial Infarction] : no recent myocardial infarction [Asthma] : no asthma [COPD] : no COPD [Sleep Apnea] : no sleep apnea [Smoker] : not a smoker [Self] : no previous adverse anesthesia reaction [Chronic Anticoagulation] : no chronic anticoagulation [Chronic Kidney Disease] : no chronic kidney disease [Diabetes] : no diabetes [FreeTextEntry1] : Cystoscopy, right retrograde pyelogram, right ureteroscopy with     laser lithotripsy, right ureteral stent exchange [FreeTextEntry2] : 2/13 [FreeTextEntry3] : Dr. Spencer [FreeTextEntry4] : The patient presents today for medical optimization for Cystoscopy, right retrograde pyelogram, right ureteroscopy with laser lithotripsy, right ureteral stent exchange.   Pt denies cp or SOB with going up 2 flights of stairs. Denies snoring or hx of sleep apena. Denies previous adverse reaction with anesthesia. Denies history of structural or arrhythmic cardiac disease.

## 2024-02-05 NOTE — PLAN
[FreeTextEntry1] : 1. Pre-op Eval - Cystoscopy, right retrograde pyelogram, right ureteroscopy with laser lithotripsy, right ureteral stent exchange on 2/13 - No known hx of structural or arrhythmic cardiac conditions, sleep apnea, previous adverse reactions to anesthesia  - Able to complete >4 METs - CBC, CMP, A1c ordered - EKG in chart from 1/2/24 reviewed -- NSR; no ST elevations  - Medical Optimization pending above  -- Addendum 2/5/23: - Labs reviewed - Pt is medically optimized as best as possible at this time for low risk procedure of cystoscopy, right retrograde pyelogram, right ureteroscopy with laser lithotripsy, right ureteral stent exchange on 2/13

## 2024-02-07 ENCOUNTER — OUTPATIENT (OUTPATIENT)
Dept: OUTPATIENT SERVICES | Facility: HOSPITAL | Age: 54
LOS: 1 days | End: 2024-02-07
Payer: MEDICAID

## 2024-02-07 VITALS
RESPIRATION RATE: 17 BRPM | HEART RATE: 88 BPM | TEMPERATURE: 97 F | SYSTOLIC BLOOD PRESSURE: 128 MMHG | DIASTOLIC BLOOD PRESSURE: 77 MMHG | HEIGHT: 64 IN | OXYGEN SATURATION: 100 % | WEIGHT: 151.9 LBS

## 2024-02-07 DIAGNOSIS — N20.0 CALCULUS OF KIDNEY: ICD-10-CM

## 2024-02-07 DIAGNOSIS — Z01.818 ENCOUNTER FOR OTHER PREPROCEDURAL EXAMINATION: ICD-10-CM

## 2024-02-07 DIAGNOSIS — Z90.79 ACQUIRED ABSENCE OF OTHER GENITAL ORGAN(S): Chronic | ICD-10-CM

## 2024-02-07 DIAGNOSIS — Z98.890 OTHER SPECIFIED POSTPROCEDURAL STATES: Chronic | ICD-10-CM

## 2024-02-07 DIAGNOSIS — Z90.49 ACQUIRED ABSENCE OF OTHER SPECIFIED PARTS OF DIGESTIVE TRACT: Chronic | ICD-10-CM

## 2024-02-07 DIAGNOSIS — I10 ESSENTIAL (PRIMARY) HYPERTENSION: ICD-10-CM

## 2024-02-07 LAB
APPEARANCE UR: CLEAR — SIGNIFICANT CHANGE UP
BACTERIA # UR AUTO: ABNORMAL /HPF
BILIRUB UR-MCNC: NEGATIVE — SIGNIFICANT CHANGE UP
COLOR SPEC: YELLOW — SIGNIFICANT CHANGE UP
DIFF PNL FLD: ABNORMAL
EPI CELLS # UR: PRESENT
GLUCOSE UR QL: NEGATIVE MG/DL — SIGNIFICANT CHANGE UP
KETONES UR-MCNC: NEGATIVE MG/DL — SIGNIFICANT CHANGE UP
LEUKOCYTE ESTERASE UR-ACNC: ABNORMAL
NITRITE UR-MCNC: NEGATIVE — SIGNIFICANT CHANGE UP
PH UR: 6 — SIGNIFICANT CHANGE UP (ref 5–8)
PROT UR-MCNC: 30 MG/DL
RBC CASTS # UR COMP ASSIST: 23 /HPF — HIGH (ref 0–4)
SP GR SPEC: 1.01 — SIGNIFICANT CHANGE UP (ref 1–1.03)
UROBILINOGEN FLD QL: 0.2 MG/DL — SIGNIFICANT CHANGE UP (ref 0.2–1)
WBC UR QL: 2 /HPF — SIGNIFICANT CHANGE UP (ref 0–5)

## 2024-02-07 RX ORDER — SODIUM CHLORIDE 9 MG/ML
3 INJECTION INTRAMUSCULAR; INTRAVENOUS; SUBCUTANEOUS EVERY 8 HOURS
Refills: 0 | Status: DISCONTINUED | OUTPATIENT
Start: 2024-02-27 | End: 2024-02-27

## 2024-02-07 NOTE — H&P PST ADULT - NSICDXFAMILYHX_GEN_ALL_CORE_FT
FAMILY HISTORY:  Father  Still living? Yes, Estimated age: 70  FH: CABG (coronary artery bypass surgery), Age at diagnosis: Age Unknown  FH: HTN (hypertension), Age at diagnosis: Age Unknown    Mother  Still living? Yes, Estimated age: 72  FHx: breast cancer, Age at diagnosis: Age Unknown

## 2024-02-07 NOTE — H&P PST ADULT - NSICDXPROCEDURE_GEN_ALL_CORE_FT
PROCEDURES:  Ureteroscopy with lithotripsy, removal of calculus, and insertion of stent 07-Feb-2024 10:20:45  Holli Mitchell

## 2024-02-07 NOTE — H&P PST ADULT - PROBLEM SELECTOR PLAN 2
Scheduled for cystoscopy right retrograde pyelogram right ureteroscopy with laser lithotripsy right ureteral stent exchange.    Pt instructed to be NPO the night before and the morning of surgery, Provided with chlorhexidene 4 % solution to wash for 3 days including the morning of surgery. Written instructions given and reviewed for clarity. Tylenol only to be used prior to surgery. Escort necessary post procedure.    FLORES=2

## 2024-02-07 NOTE — H&P PST ADULT - HISTORY OF PRESENT ILLNESS
53 yr old female history of HTN and nephrolithiasis s/p ESWL years ago. Pt recently came to this er with severe right flank pain had imaging done on 1/2/2024 indicated 9mm right mid ureteral stone urgent stent placement symptoms of intermittent hematuria, urgency and frequency pt discharged Pt scheduled for cystoscopy right retrograde pyelogram right ureteroscopy with laser lithotripsy right ureteral stent exchange on 2/13/2024. Lab work attached seen by Dr. Colletta Morgan at clinic.

## 2024-02-07 NOTE — H&P PST ADULT - NSICDXPASTSURGICALHX_GEN_ALL_CORE_FT
PAST SURGICAL HISTORY:  H/O hemorrhoidectomy     History of cholecystectomy     History of salpingoophorectomy bilateral

## 2024-02-07 NOTE — H&P PST ADULT - NSANTHOSAYNRD_GEN_A_CORE
No. FLORES screening performed.  STOP BANG Legend: 0-2 = LOW Risk; 3-4 = INTERMEDIATE Risk; 5-8 = HIGH Risk Nosebleeds Normal Treatment: I explained this is common when taking isotretinoin. I recommended saline mist in each nostril multiple times a day. If this worsens they will contact us.

## 2024-02-07 NOTE — H&P PST ADULT - ASSESSMENT
53 yr old female with history of HTN presents with calculus of kidney scheduled for cystoscopy right retrograde pyelogram right ureteroscopy with laser lithotripsy right ureteral stent exchange on 2/13/2024.

## 2024-02-07 NOTE — H&P PST ADULT - NSICDXPASTMEDICALHX_GEN_ALL_CORE_FT
PAST MEDICAL HISTORY:  Calculus of kidney     Chronic abdominal pain unknown source    Hemorrhoids     HTN (hypertension)     Shingles

## 2024-02-08 PROCEDURE — G0463: CPT

## 2024-02-09 LAB
CULTURE RESULTS: SIGNIFICANT CHANGE UP
SPECIMEN SOURCE: SIGNIFICANT CHANGE UP

## 2024-02-15 ENCOUNTER — NON-APPOINTMENT (OUTPATIENT)
Age: 54
End: 2024-02-15

## 2024-02-15 RX ORDER — OXYBUTYNIN CHLORIDE 5 MG/1
5 TABLET ORAL
Qty: 30 | Refills: 0 | Status: ACTIVE | COMMUNITY
Start: 2024-01-22 | End: 1900-01-01

## 2024-02-21 PROBLEM — N20.0 CALCULUS OF KIDNEY: Chronic | Status: ACTIVE | Noted: 2024-02-07

## 2024-02-23 ENCOUNTER — OUTPATIENT (OUTPATIENT)
Dept: OUTPATIENT SERVICES | Facility: HOSPITAL | Age: 54
LOS: 1 days | End: 2024-02-23

## 2024-02-23 DIAGNOSIS — Z90.79 ACQUIRED ABSENCE OF OTHER GENITAL ORGAN(S): Chronic | ICD-10-CM

## 2024-02-23 DIAGNOSIS — Z01.818 ENCOUNTER FOR OTHER PREPROCEDURAL EXAMINATION: ICD-10-CM

## 2024-02-23 DIAGNOSIS — Z98.890 OTHER SPECIFIED POSTPROCEDURAL STATES: Chronic | ICD-10-CM

## 2024-02-23 DIAGNOSIS — Z90.49 ACQUIRED ABSENCE OF OTHER SPECIFIED PARTS OF DIGESTIVE TRACT: Chronic | ICD-10-CM

## 2024-02-23 DIAGNOSIS — N20.0 CALCULUS OF KIDNEY: ICD-10-CM

## 2024-02-26 ENCOUNTER — TRANSCRIPTION ENCOUNTER (OUTPATIENT)
Age: 54
End: 2024-02-26

## 2024-02-27 ENCOUNTER — APPOINTMENT (OUTPATIENT)
Age: 54
End: 2024-02-27

## 2024-02-27 ENCOUNTER — INPATIENT (INPATIENT)
Facility: HOSPITAL | Age: 54
LOS: 1 days | Discharge: HOME | DRG: 661 | End: 2024-02-29
Attending: UROLOGY | Admitting: UROLOGY
Payer: MEDICAID

## 2024-02-27 ENCOUNTER — RESULT REVIEW (OUTPATIENT)
Age: 54
End: 2024-02-27

## 2024-02-27 VITALS
OXYGEN SATURATION: 100 % | HEIGHT: 64 IN | SYSTOLIC BLOOD PRESSURE: 130 MMHG | WEIGHT: 151.9 LBS | HEART RATE: 70 BPM | RESPIRATION RATE: 17 BRPM | TEMPERATURE: 98 F | DIASTOLIC BLOOD PRESSURE: 70 MMHG

## 2024-02-27 DIAGNOSIS — Z98.890 OTHER SPECIFIED POSTPROCEDURAL STATES: Chronic | ICD-10-CM

## 2024-02-27 DIAGNOSIS — Z90.79 ACQUIRED ABSENCE OF OTHER GENITAL ORGAN(S): Chronic | ICD-10-CM

## 2024-02-27 DIAGNOSIS — Z90.49 ACQUIRED ABSENCE OF OTHER SPECIFIED PARTS OF DIGESTIVE TRACT: Chronic | ICD-10-CM

## 2024-02-27 DIAGNOSIS — N20.0 CALCULUS OF KIDNEY: ICD-10-CM

## 2024-02-27 LAB
ALBUMIN SERPL ELPH-MCNC: 3.9 G/DL — SIGNIFICANT CHANGE UP (ref 3.5–5)
ALP SERPL-CCNC: 133 U/L — HIGH (ref 40–120)
ALT FLD-CCNC: 29 U/L DA — SIGNIFICANT CHANGE UP (ref 10–60)
ANION GAP SERPL CALC-SCNC: 6 MMOL/L — SIGNIFICANT CHANGE UP (ref 5–17)
AST SERPL-CCNC: 24 U/L — SIGNIFICANT CHANGE UP (ref 10–40)
BILIRUB SERPL-MCNC: 0.3 MG/DL — SIGNIFICANT CHANGE UP (ref 0.2–1.2)
BUN SERPL-MCNC: 9 MG/DL — SIGNIFICANT CHANGE UP (ref 7–18)
CALCIUM SERPL-MCNC: 8.9 MG/DL — SIGNIFICANT CHANGE UP (ref 8.4–10.5)
CHLORIDE SERPL-SCNC: 108 MMOL/L — SIGNIFICANT CHANGE UP (ref 96–108)
CO2 SERPL-SCNC: 25 MMOL/L — SIGNIFICANT CHANGE UP (ref 22–31)
CREAT SERPL-MCNC: 0.92 MG/DL — SIGNIFICANT CHANGE UP (ref 0.5–1.3)
EGFR: 74 ML/MIN/1.73M2 — SIGNIFICANT CHANGE UP
GLUCOSE SERPL-MCNC: 121 MG/DL — HIGH (ref 70–99)
POTASSIUM SERPL-MCNC: 3.9 MMOL/L — SIGNIFICANT CHANGE UP (ref 3.5–5.3)
POTASSIUM SERPL-SCNC: 3.9 MMOL/L — SIGNIFICANT CHANGE UP (ref 3.5–5.3)
PROT SERPL-MCNC: 8.1 G/DL — SIGNIFICANT CHANGE UP (ref 6–8.3)
SODIUM SERPL-SCNC: 139 MMOL/L — SIGNIFICANT CHANGE UP (ref 135–145)
TROPONIN I, HIGH SENSITIVITY RESULT: 3.4 NG/L — SIGNIFICANT CHANGE UP

## 2024-02-27 PROCEDURE — 71275 CT ANGIOGRAPHY CHEST: CPT | Mod: 26

## 2024-02-27 PROCEDURE — 52356 CYSTO/URETERO W/LITHOTRIPSY: CPT | Mod: RT

## 2024-02-27 PROCEDURE — 74420 UROGRAPHY RTRGR +-KUB: CPT | Mod: 26

## 2024-02-27 PROCEDURE — 88300 SURGICAL PATH GROSS: CPT | Mod: 26

## 2024-02-27 DEVICE — GUIDEWIRE SENSOR DUAL-FLEX NITINOL STRAIGHT .035" X 150CM: Type: IMPLANTABLE DEVICE | Status: FUNCTIONAL

## 2024-02-27 DEVICE — URETERAL STENT PERCUFLEX PLUS 6FR 26CM: Type: IMPLANTABLE DEVICE | Status: FUNCTIONAL

## 2024-02-27 DEVICE — STENT URETHRAL PIGTL DBL 6FRX22CM: Type: IMPLANTABLE DEVICE | Status: FUNCTIONAL

## 2024-02-27 DEVICE — URETERAL STENT PERCUFLEX PLUS 6FR 24CM: Type: IMPLANTABLE DEVICE | Status: FUNCTIONAL

## 2024-02-27 DEVICE — STONE BASKET ZEROTIP NITINOL 4-WIRE 1.9FR 120CM X 12MM: Type: IMPLANTABLE DEVICE | Status: FUNCTIONAL

## 2024-02-27 DEVICE — URETERAL CATH OPEN END 5FR 70CM: Type: IMPLANTABLE DEVICE | Status: FUNCTIONAL

## 2024-02-27 DEVICE — LASER FIBER MOSES 550 D/F/L: Type: IMPLANTABLE DEVICE | Status: FUNCTIONAL

## 2024-02-27 DEVICE — URETERAL STENT PERCUFLEX PLUS 6FR 28CM: Type: IMPLANTABLE DEVICE | Status: FUNCTIONAL

## 2024-02-27 RX ORDER — ACETAMINOPHEN 500 MG
1000 TABLET ORAL ONCE
Refills: 0 | Status: DISCONTINUED | OUTPATIENT
Start: 2024-02-27 | End: 2024-02-27

## 2024-02-27 RX ORDER — HYDROMORPHONE HYDROCHLORIDE 2 MG/ML
0.5 INJECTION INTRAMUSCULAR; INTRAVENOUS; SUBCUTANEOUS
Refills: 0 | Status: DISCONTINUED | OUTPATIENT
Start: 2024-02-27 | End: 2024-02-27

## 2024-02-27 RX ORDER — ACETAMINOPHEN 500 MG
650 TABLET ORAL EVERY 6 HOURS
Refills: 0 | Status: DISCONTINUED | OUTPATIENT
Start: 2024-02-27 | End: 2024-02-29

## 2024-02-27 RX ORDER — KETOROLAC TROMETHAMINE 30 MG/ML
15 SYRINGE (ML) INJECTION EVERY 6 HOURS
Refills: 0 | Status: DISCONTINUED | OUTPATIENT
Start: 2024-02-27 | End: 2024-02-29

## 2024-02-27 RX ORDER — LOSARTAN POTASSIUM 100 MG/1
1 TABLET, FILM COATED ORAL
Refills: 0 | DISCHARGE

## 2024-02-27 RX ORDER — FENTANYL CITRATE 50 UG/ML
25 INJECTION INTRAVENOUS
Refills: 0 | Status: DISCONTINUED | OUTPATIENT
Start: 2024-02-27 | End: 2024-02-27

## 2024-02-27 RX ORDER — DEXTROSE MONOHYDRATE, SODIUM CHLORIDE, AND POTASSIUM CHLORIDE 50; .745; 4.5 G/1000ML; G/1000ML; G/1000ML
1000 INJECTION, SOLUTION INTRAVENOUS
Refills: 0 | Status: DISCONTINUED | OUTPATIENT
Start: 2024-02-27 | End: 2024-02-29

## 2024-02-27 RX ADMIN — HYDROMORPHONE HYDROCHLORIDE 0.5 MILLIGRAM(S): 2 INJECTION INTRAMUSCULAR; INTRAVENOUS; SUBCUTANEOUS at 15:14

## 2024-02-27 RX ADMIN — DEXTROSE MONOHYDRATE, SODIUM CHLORIDE, AND POTASSIUM CHLORIDE 110 MILLILITER(S): 50; .745; 4.5 INJECTION, SOLUTION INTRAVENOUS at 23:45

## 2024-02-27 RX ADMIN — Medication 15 MILLIGRAM(S): at 18:25

## 2024-02-27 RX ADMIN — HYDROMORPHONE HYDROCHLORIDE 0.5 MILLIGRAM(S): 2 INJECTION INTRAMUSCULAR; INTRAVENOUS; SUBCUTANEOUS at 14:59

## 2024-02-27 RX ADMIN — Medication 15 MILLIGRAM(S): at 17:24

## 2024-02-27 NOTE — ASU DISCHARGE PLAN (ADULT/PEDIATRIC) - BATHING
Azithromycin once daily x 5 days.  Continue taking the medication for the entire time, even if improving over the next couple of days  Side effects of antibiotic use including but not limited to diarrhea, abdominal pain and allergy  Take the medication with food and eat yogurt or take a probiotic while taking  May use pain/fever (Tylenol/Ibuprofen) reducer if needed  Keep well-hydrated  Use a humidifier in the room at night  Slightly elevate the head of the bed if having difficulty with sleeping.  Follow up with PCP if not improving in next 48-72 hours, sooner if worse.      
No change

## 2024-02-27 NOTE — ASU DISCHARGE PLAN (ADULT/PEDIATRIC) - ASU DC SPECIAL INSTRUCTIONSFT
Please follow-up with your surgeon in 1-2 weeks. Drink plenty of fluids and rest as needed. Call for any fever over 101, nausea, vomiting, severe pain, or bleeding.     PAIN CONTROL: You may take Motrin 600mg-800mg (with food) every 6 hours or Tylenol 650mg-1000mg every 6 hours as needed for mild pain. Stagger one medication 3 hours after the other for maximum pain control. Maximum daily dose of Tylenol should not exceed 4000mg/day.

## 2024-02-27 NOTE — ASU DISCHARGE PLAN (ADULT/PEDIATRIC) - NS MD DC FALL RISK RISK
For information on Fall & Injury Prevention, visit: [email or web address] OR  [email or web address] OR  https://www.cdc.gov/steadi/patient.html

## 2024-02-27 NOTE — BRIEF OPERATIVE NOTE - FIRST ASSIST CATEGORY
No Resident Program within this Specialty at this Location No qualified resident/fellow available to assist

## 2024-02-27 NOTE — CHART NOTE - NSCHARTNOTEFT_GEN_A_CORE
Pt seen, c/o mid-sternal non-radiating chest pain, rated a 7/10, unrelieved by Dilaudid.  Pt denies any difficulty breathing or worsening of chest pain with deep breath.     Vital signs at bedside  HR 88, /86, 96% saturation, RR 15, afebrile    Troponins drawn, EKG obtained, NSR (no changes from preop EKG)    Discussed case with Dr. Borden and Dr. Spencer  Plan to admit to Dr. Spencer with cardiology consult  Will follow troponins, and reassess patient: if chest pain does not resolve will obtain CTA today

## 2024-02-27 NOTE — ASU DISCHARGE PLAN (ADULT/PEDIATRIC) - CARE PROVIDER_API CALL
Zah[facility] Spencer [facility]  Urology  [address] [place] [address]  Phone: [phone]  Fax: [phone]  Follow Up Time:

## 2024-02-27 NOTE — BRIEF OPERATIVE NOTE - NSICDXBRIEFPROCEDURE_GEN_ALL_CORE_FT
PROCEDURES:  Cystoscopy with right retrograde pyelography with ureteroscopy and procedure involving calculus 27-Feb-2024 14:54:13  Nicol Vyas

## 2024-02-28 ENCOUNTER — TRANSCRIPTION ENCOUNTER (OUTPATIENT)
Age: 54
End: 2024-02-28

## 2024-02-28 LAB
ANION GAP SERPL CALC-SCNC: 4 MMOL/L — LOW (ref 5–17)
BUN SERPL-MCNC: 9 MG/DL — SIGNIFICANT CHANGE UP (ref 7–18)
CALCIUM SERPL-MCNC: 8.7 MG/DL — SIGNIFICANT CHANGE UP (ref 8.4–10.5)
CHLORIDE SERPL-SCNC: 108 MMOL/L — SIGNIFICANT CHANGE UP (ref 96–108)
CK MB BLD-MCNC: <2.9 % — SIGNIFICANT CHANGE UP (ref 0–3.5)
CK MB CFR SERPL CALC: <1 NG/ML — SIGNIFICANT CHANGE UP (ref 0–3.6)
CK SERPL-CCNC: 34 U/L — SIGNIFICANT CHANGE UP (ref 21–215)
CO2 SERPL-SCNC: 28 MMOL/L — SIGNIFICANT CHANGE UP (ref 22–31)
CREAT SERPL-MCNC: 0.85 MG/DL — SIGNIFICANT CHANGE UP (ref 0.5–1.3)
EGFR: 81 ML/MIN/1.73M2 — SIGNIFICANT CHANGE UP
GLUCOSE SERPL-MCNC: 120 MG/DL — HIGH (ref 70–99)
HCT VFR BLD CALC: 38 % — SIGNIFICANT CHANGE UP (ref 34.5–45)
HGB BLD-MCNC: 12.5 G/DL — SIGNIFICANT CHANGE UP (ref 11.5–15.5)
MCHC RBC-ENTMCNC: 29.5 PG — SIGNIFICANT CHANGE UP (ref 27–34)
MCHC RBC-ENTMCNC: 32.9 GM/DL — SIGNIFICANT CHANGE UP (ref 32–36)
MCV RBC AUTO: 89.6 FL — SIGNIFICANT CHANGE UP (ref 80–100)
NRBC # BLD: 0 /100 WBCS — SIGNIFICANT CHANGE UP (ref 0–0)
PLATELET # BLD AUTO: 190 K/UL — SIGNIFICANT CHANGE UP (ref 150–400)
POTASSIUM SERPL-MCNC: 4.3 MMOL/L — SIGNIFICANT CHANGE UP (ref 3.5–5.3)
POTASSIUM SERPL-SCNC: 4.3 MMOL/L — SIGNIFICANT CHANGE UP (ref 3.5–5.3)
RBC # BLD: 4.24 M/UL — SIGNIFICANT CHANGE UP (ref 3.8–5.2)
RBC # FLD: 11.7 % — SIGNIFICANT CHANGE UP (ref 10.3–14.5)
SODIUM SERPL-SCNC: 140 MMOL/L — SIGNIFICANT CHANGE UP (ref 135–145)
TROPONIN I, HIGH SENSITIVITY RESULT: 3.3 NG/L — SIGNIFICANT CHANGE UP
WBC # BLD: 4.16 K/UL — SIGNIFICANT CHANGE UP (ref 3.8–10.5)
WBC # FLD AUTO: 4.16 K/UL — SIGNIFICANT CHANGE UP (ref 3.8–10.5)

## 2024-02-28 PROCEDURE — 99232 SBSQ HOSP IP/OBS MODERATE 35: CPT

## 2024-02-28 RX ORDER — PHENAZOPYRIDINE HCL 100 MG
100 TABLET ORAL EVERY 8 HOURS
Refills: 0 | Status: COMPLETED | OUTPATIENT
Start: 2024-02-28 | End: 2024-02-29

## 2024-02-28 RX ADMIN — Medication 15 MILLIGRAM(S): at 05:50

## 2024-02-28 RX ADMIN — Medication 100 MILLIGRAM(S): at 22:18

## 2024-02-28 RX ADMIN — Medication 15 MILLIGRAM(S): at 05:37

## 2024-02-28 NOTE — PROGRESS NOTE ADULT - SUBJECTIVE AND OBJECTIVE BOX
Subjective  CTA negative overnight. Chest pain resolved, endorses mild right flank pain.     Objective    Vital signs  T(F): , Max: 98.1 (02-27-24 @ 14:29)  HR: 72 (02-27-24 @ 23:57)  BP: 124/79 (02-27-24 @ 23:57)  SpO2: 97% (02-27-24 @ 23:57)  Wt(kg): --    Output     OUT:    Voided (mL): 750 mL  Total OUT: 750 mL    Total NET: -750 mL          Gen: NAD  Abd: soft, nontender, nondistended  : bob secured in place, draining CYU    Labs      02-27 @ 16:14    WBC --    / Hct --    / SCr 0.92           Urine Cx: ?  Blood Cx: ?    Imaging Subjective  CTA negative overnight. Chest pain resolved, endorses mild right flank pain.     Objective    Vital signs  T(F): , Max: 98.1 (02-27-24 @ 14:29)  HR: 72 (02-27-24 @ 23:57)  BP: 124/79 (02-27-24 @ 23:57)  SpO2: 97% (02-27-24 @ 23:57)  Wt(kg): --    Output     OUT:    Voided (mL): 750 mL  Total OUT: 750 mL    Total NET: -750 mL          Gen: NAD  Abd: soft, nontender, nondistended  : voiding spontaneously     Labs      02-27 @ 16:14    WBC --    / Hct --    / SCr 0.92       Imaging  < from: CT Angio Chest PE Protocol w/ IV Cont (02.27.24 @ 21:18) >    ACC: [id number] EXAM:  CT ANGIO CHEST PULM ART WAWI   ORDERED BY:  ROBY DESAI     PROCEDURE DATE:  02/27/2024          INTERPRETATION:  CLINICAL INDICATION: postop chest pain, unresolved    PROCEDURE: CT angiography of the chest was performed with intravenous   contrast utilizing dedicated PE protocol. Coronal and sagittal   reconstruction images were obtained. Axial MIP images were obtained from   a separate workstation.    CONTRAST/COMPLICATIONS:  IV Contrast: Omnipaque 350  80 cc administered   20 cc discarded  Oral Contrast: NONE  Complications: None reported at time of study completion    COMPARISON: 1/2/2024. 8/29/2019.    FINDINGS:    LUNGS AND AIRWAYS: Patent central airways. Question mild emphysema.   Linear opacities linear opacities at the lower lobes, suggesting   atelectasis.  PLEURA: No pleural effusion or pneumothorax.  HEART: Normal size. No pericardial effusion.  VESSELS: No obvious pulmonary embolus. Normal caliber of the thoracic   aorta.  MEDIASTINUM AND HERIBERTO: No lymphadenopathy.  CHEST WALL AND LOWER NECK: Unremarkable.  UPPER ABDOMEN: Cholecystectomy. Mild decreased extent of right   hydronephrosis since 1/2/2024. Right perinephric stranding. Partially   visualized right ureteral stent. Focus of air atthe visualized right   proximal collecting system. Small area of increased attenuation at the   right posterolateral kidney, which may be related to persistent   nephrogram.  BONES: Degenerative changes of the spine.    IMPRESSION:    No obvious pulmonary embolus. Additional findings as described.    --- End of Report ---      < end of copied text >

## 2024-02-28 NOTE — CONSULT NOTE ADULT - SUBJECTIVE AND OBJECTIVE BOX
C A R D I O L O G Y  *********************    DATE OF SERVICE: 02-28-24    HISTORY OF PRESENT ILLNESS:   53y F HTN and nephrolithiasis s/p ESWL years ago. Pt recently came to this er with severe right flank pain had imaging done on 1/2/2024 indicated 9mm right mid ureteral stone urgent stent placement symptoms of intermittent hematuria, urgency and frequency pt discharged Pt is now s/p  cystoscopy right retrograde pyelogram right ureteroscopy.  In the PACU she complained of chest pain which is currently improved.  Overnight she ruled out for MI and a CT of the chest was negative for PE.    PAST MEDICAL & SURGICAL HISTORY:  Hemorrhoids  Shingles  Chronic abdominal pain  unknown source  HTN (hypertension)  Calculus of kidney  History of cholecystectomy  History of salpingoophorectomy  bilateral  H/O hemorrhoidectomy        MEDICATIONS:  MEDICATIONS  (STANDING):  dextrose 5% + sodium chloride 0.45% with potassium chloride 20 mEq/L 1000 milliLiter(s) (110 mL/Hr) IV Continuous <Continuous>      Allergies    No Known Allergies    Intolerances        FAMILY HISTORY:  FH: HTN (hypertension) (Father)    FH: CABG (coronary artery bypass surgery) (Father)    FHx: breast cancer (Mother)      Non-contributary for premature coronary disease or sudden cardiac death    SOCIAL HISTORY:    [X ] Non-smoker  [ ] Smoker  [ ] Alcohol    FLU VACCINE THIS YEAR STARTS IN AUGUST:  [ ] Yes    [ ] No    IF OVER 65 HAVE YOU EVER HAD A PNA VACCINE:  [ ] Yes    [ ] No       [ ] N/A      REVIEW OF SYSTEMS:  [ X]chest pain  [  ]shortness of breath  [  ]palpitations  [  ]syncope  [ ]near syncope [ ]upper extremity weakness   [ ] lower extremity weakness  [  ]diplopia  [  ]altered mental status   [  ]fevers  [ ]chills [ ]nausea  [ ]vomitting  [  ]dysphagia    [ ]abdominal pain  [ ]melena  [ ]BRBPR    [  ]epistaxis  [  ]rash    [ ]lower extremity edema        [X] All others negative	  [ ] Unable to obtain      LABS:	 	    CARDIAC MARKERS:        Troponin I, High Sensitivity Result: 3.4 ng/L (02-27-24 @ 16:14)                            12.5   4.16  )-----------( 190      ( 28 Feb 2024 05:24 )             38.0     Hb Trend: 12.5<--    02-28    140  |  108  |  9   ----------------------------<  120<H>  4.3   |  28  |  0.85    Ca    8.7      28 Feb 2024 05:24    TPro  8.1  /  Alb  3.9  /  TBili  0.3  /  DBili  x   /  AST  24  /  ALT  29  /  AlkPhos  133<H>  02-27    Creatinine Trend: 0.85<--, 0.92<--          PHYSICAL EXAM:  T(C): 36.7 (02-28-24 @ 06:01), Max: 36.7 (02-27-24 @ 14:29)  HR: 63 (02-28-24 @ 06:01) (63 - 96)  BP: 113/75 (02-28-24 @ 06:01) (113/72 - 154/85)  RR: 16 (02-28-24 @ 06:01) (10 - 21)  SpO2: 97% (02-28-24 @ 06:01) (95% - 100%)  Wt(kg): --   BMI (kg/m2): 26.1 (02-27-24 @ 12:22)  I&O's Summary    27 Feb 2024 07:01  -  28 Feb 2024 07:00  --------------------------------------------------------  IN: 500 mL / OUT: 750 mL / NET: -250 mL      HEENT:  (-)icterus (-)pallor  CV: N S1 S2 1/6 ANN (+)2 Pulses B/l  Resp:  Clear to ausculatation B/L, normal effort  GI: (+) BS Soft, NT, ND  Lymph:  (-)Edema, (-)obvious lymphadenopathy  Skin: Warm to touch, Normal turgor  Psych: Appropriate mood and affect        TELEMETRY: 	  off    ECG:  	Sinus 87 BPM    RADIOLOGY:         CXR:   NONE    ASSESSMENT/PLAN: 	53y Female HTN and nephrolithiasis s/p ESWL years ago. Pt recently came to this er with severe right flank pain had imaging done on 1/2/2024 indicated 9mm right mid ureteral stone urgent stent placement symptoms of intermittent hematuria, urgency and frequency pt discharged Pt is now s/p  cystoscopy right retrograde pyelogram right ureteroscopy.     # CP  - Non-anginal CP f/u second set of CE  - Obtain reports from dr. Forest Avila.  Patient reports she had a normal nuclear stress test in june that was reportedly normal (call placed )  - f/u echo    # S/p cystoscopy  -  f/u    I once again thank you for allowing me to participate in the care of your patient.  If you have any questions or concerns please do not hesitate to contact me.    Sher Borden MD, [facility]  BEEPER [phone]

## 2024-02-28 NOTE — PROGRESS NOTE ADULT - ASSESSMENT
53y Female who is s/p R URS and stent placement with chest pain postoperatively, CTA, trops and EKG negative; chest pain resolved    Plan:  - Pain control prn  - Reg diet   - Incentive Spirometry  - OOB and ambulating as tolerated  - F/u AM labs  - DVT ppx  - Discharge today        53y Female who is s/p R URS and stent placement with chest pain postoperatively, CTA, trops and EKG negative; chest pain resolved    Plan:  - Fu AM labs  - Pain control prn  - Reg diet   - Incentive Spirometry  - OOB and ambulating as tolerated  - F/u AM labs  - DVT ppx  - Discharge today pending cardiology clearance    Discussed with Dr. Spencer

## 2024-02-28 NOTE — DISCHARGE NOTE NURSING/CASE MANAGEMENT/SOCIAL WORK - NSDCPEFALRISK_GEN_ALL_CORE
For information on Fall & Injury Prevention, visit: [email or web address] OR  [email or web address] OR  https://www.cdc.gov/steadi/patient.html

## 2024-02-28 NOTE — DISCHARGE NOTE NURSING/CASE MANAGEMENT/SOCIAL WORK - PATIENT PORTAL LINK FT
You can access the FollowMyHealth Patient Portal offered by [facility] by registering at the following website: [email or web address]. By joining [employer]’s FollowMyHealth portal, you will also be able to view your health information using other applications (apps) compatible with our system.

## 2024-02-28 NOTE — PROGRESS NOTE ADULT - SUBJECTIVE AND OBJECTIVE BOX
POST-OPERATIVE NOTE    Subjective:   53y Female s/p cysto with rt ureteral stent placement POD #0 . Seen and examined at bed side . Denies nausea, vomiting, chest pain, sob, fevers chills. Pain is well controlled. . Voiding .    Vital Signs Last 24 Hrs  T(C): 36.7 (27 Feb 2024 23:57), Max: 36.7 (27 Feb 2024 14:29)  T(F): 98 (27 Feb 2024 23:57), Max: 98.1 (27 Feb 2024 14:29)  HR: 72 (27 Feb 2024 23:57) (70 - 96)  BP: 124/79 (27 Feb 2024 23:57) (113/72 - 154/85)  BP(mean): 85 (27 Feb 2024 20:04) (85 - 123)  RR: 17 (27 Feb 2024 23:57) (10 - 21)  SpO2: 97% (27 Feb 2024 23:57) (95% - 100%)    Parameters below as of 27 Feb 2024 23:57  Patient On (Oxygen Delivery Method): room air      I&O's Detail    27 Feb 2024 07:01  -  28 Feb 2024 04:08  --------------------------------------------------------  IN:    Lactated Ringers Bolus: 500 mL  Total IN: 500 mL    OUT:    Voided (mL): 750 mL  Total OUT: 750 mL    Total NET: -250 mL          Physical Exam:  General: NAD, resting comfortably in bed  Pulmonary: Nonlabored breathing, no respiratory distress  Cardiovascular: NSR, S1, S2  Abdominal: soft, NT/ND,  Extremities: no edema, no calf tenderness, distal pulses are palpable     LABS:    02-27    139  |  108  |  9   ----------------------------<  121<H>  3.9   |  25  |  0.92    Ca    8.9      27 Feb 2024 16:14    TPro  8.1  /  Alb  3.9  /  TBili  0.3  /  DBili  x   /  AST  24  /  ALT  29  /  AlkPhos  133<H>  02-27    LIVER FUNCTIONS - ( 27 Feb 2024 16:14 )  Alb: 3.9 g/dL / Pro: 8.1 g/dL / ALK PHOS: 133 U/L / ALT: 29 U/L DA / AST: 24 U/L / GGT: x             MEDICATIONS  (STANDING):  dextrose 5% + sodium chloride 0.45% with potassium chloride 20 mEq/L 1000 milliLiter(s) (110 mL/Hr) IV Continuous <Continuous>    MEDICATIONS  (PRN):  acetaminophen     Tablet .. 650 milliGRAM(s) Oral every 6 hours PRN Temp greater or equal to 38C (100.4F), Mild Pain (1 - 3)  ketorolac   Injectable 15 milliGRAM(s) IV Push every 6 hours PRN Moderate Pain (4 - 6)      Assessment:   53y Female who is s/p cysto with rt ureteral stent placement POD #0 . Stable    Plan:  - Pain control prn  - Reg diet and DC IV fluids  - Incentive Spirometry  - OOB and ambulating as tolerated  - F/u AM labs  - DVT ppx

## 2024-02-28 NOTE — PROGRESS NOTE ADULT - ATTENDING COMMENTS
Pt seen and examined. Agree with note as written above    - Plan as above  - Cards clearance for discharge pending

## 2024-02-28 NOTE — PROGRESS NOTE ADULT - SUBJECTIVE AND OBJECTIVE BOX
Paged cardiology to determine if workup advised intended to be done as Inpatient or outpatient as Urology Attending indicates that from  Point of view pt is ready for discharge.

## 2024-02-29 ENCOUNTER — TRANSCRIPTION ENCOUNTER (OUTPATIENT)
Age: 54
End: 2024-02-29

## 2024-02-29 ENCOUNTER — RESULT REVIEW (OUTPATIENT)
Age: 54
End: 2024-02-29

## 2024-02-29 VITALS
SYSTOLIC BLOOD PRESSURE: 122 MMHG | HEART RATE: 70 BPM | OXYGEN SATURATION: 97 % | RESPIRATION RATE: 17 BRPM | TEMPERATURE: 98 F | DIASTOLIC BLOOD PRESSURE: 76 MMHG

## 2024-02-29 PROCEDURE — 99232 SBSQ HOSP IP/OBS MODERATE 35: CPT

## 2024-02-29 PROCEDURE — 93306 TTE W/DOPPLER COMPLETE: CPT | Mod: 26

## 2024-02-29 RX ORDER — HEPARIN SODIUM 5000 [USP'U]/ML
5000 INJECTION INTRAVENOUS; SUBCUTANEOUS EVERY 8 HOURS
Refills: 0 | Status: DISCONTINUED | OUTPATIENT
Start: 2024-02-29 | End: 2024-02-29

## 2024-02-29 RX ORDER — OXYBUTYNIN CHLORIDE 5 MG
1 TABLET ORAL
Refills: 0 | DISCHARGE

## 2024-02-29 RX ADMIN — Medication 15 MILLIGRAM(S): at 05:45

## 2024-02-29 RX ADMIN — Medication 100 MILLIGRAM(S): at 05:24

## 2024-02-29 RX ADMIN — Medication 15 MILLIGRAM(S): at 05:26

## 2024-02-29 NOTE — PROGRESS NOTE ADULT - SUBJECTIVE AND OBJECTIVE BOX
Subjective  Patient seen and examined at bedside. Awaiting echocardiogram. Endorses some dysuria, improving. Right flank pain improved.     Objective    Vital signs  T(F): , Max: 98.7 (02-28-24 @ 17:33)  HR: 67 (02-29-24 @ 05:15)  BP: 117/67 (02-29-24 @ 05:15)  SpO2: 98% (02-29-24 @ 05:15)  Wt(kg): --    Output         Gen: NAD  Abd: soft, nontender, nondistended  : voiding spontaneously    Labs      02-28 @ 05:24    WBC 4.16  / Hct 38.0  / SCr 0.85     02-27 @ 16:14    WBC --    / Hct --    / SCr 0.92

## 2024-02-29 NOTE — DISCHARGE NOTE PROVIDER - NSDCCPTREATMENT_GEN_ALL_CORE_FT
PRINCIPAL PROCEDURE  Procedure: Cystoscopy with right retrograde pyelography with ureteroscopy and procedure involving calculus  Findings and Treatment: STENT: You may have an internal stent (a hollow tube that runs from the kidney to your bladder) after your procedure, which helps urine drain from the kidney to your bladder. Some patients experience urinary frequency, burning, or even back pain (especially with urination). These sensations will gradually get better. Increasing your fluid intake can also improve these symptoms. While the stent is in place, your urine may continue to be bloody. This stent is temporary and must be removed by your urologist as an outpatient with in 3 months unless otherwise specified. If your stent is on a string, it is secured to your leg or genitalia with an adhesive bandage. Do not pull on the string, do not remove the bandage, do not insert anything intravaginally/intraurethrally, and do not engage in sexual intercourse until after the stent is removed at your post-operative appointment.  GENERAL: It is common to have blood in your urine after your procedure. It may be pink or even red; inform your doctor if you have a significant amount of clot in the urine or if you are unable to void at all. The urine may clear and then become bloody again especially as you are more physically active.  BATHING: You may shower or bathe.  DIET: You may resume your regular diet and regular medication regimen.  PAIN: You may take Tylenol (acetaminophen) 650-975mg and/or Motrin (ibuprofen) 400-600mg, both available over the counter, for pain every 6 hours as needed. Do not exceed 4000mg of Tylenol (acetaminophen) daily. You may alternate these medications such that you take one or the other every 3 hours for around the clock pain coverage. If you have a stent, the following medications may have been sent to your pharmacy for stent related discomfort: Flomax (tamsulosin) 0.4mg at bedtime until stent removed, Ditropan (oxybutynin) 5mg every 8 hours as needed for bladder spasms, and Pyridium (phenasopyridine) 100mg every 8 hours as needed for kidney/bladder discomfort for max 3 days (Pyridium will make your

## 2024-02-29 NOTE — PROGRESS NOTE ADULT - SUBJECTIVE AND OBJECTIVE BOX
C A R D I O L O G Y  **********************************     DATE OF SERVICE: 02-29-24    Patient denies chest pain or shortness of breath.   Review of systems otherwise (-)  	  MEDICATIONS:  MEDICATIONS  (STANDING):  dextrose 5% + sodium chloride 0.45% with potassium chloride 20 mEq/L 1000 milliLiter(s) (110 mL/Hr) IV Continuous <Continuous>      LABS:	 	    CARDIAC MARKERS:  CARDIAC MARKERS ( 28 Feb 2024 11:02 )  x     / x     / 34 U/L / x     / <1.0 ng/mL        Troponin I, High Sensitivity Result: 3.3 ng/L (02-28-24 @ 11:02)  Troponin I, High Sensitivity Result: 3.4 ng/L (02-27-24 @ 16:14)                              12.5   4.16  )-----------( 190      ( 28 Feb 2024 05:24 )             38.0     Hemoglobin: 12.5 g/dL (02-28 @ 05:24)      02-28    140  |  108  |  9   ----------------------------<  120<H>  4.3   |  28  |  0.85    Ca    8.7      28 Feb 2024 05:24    TPro  8.1  /  Alb  3.9  /  TBili  0.3  /  DBili  x   /  AST  24  /  ALT  29  /  AlkPhos  133<H>  02-27    Creatinine Trend: 0.85<--, 0.92<--      PHYSICAL EXAM:  T(C): 36.6 (02-29-24 @ 05:15), Max: 37.1 (02-28-24 @ 17:33)  HR: 67 (02-29-24 @ 05:15) (64 - 78)  BP: 117/67 (02-29-24 @ 05:15) (100/76 - 124/72)  RR: 15 (02-29-24 @ 05:15) (15 - 18)  SpO2: 98% (02-29-24 @ 05:15) (96% - 98%)  Wt(kg): --  I&O's Summary    28 Feb 2024 07:01  -  29 Feb 2024 07:00  --------------------------------------------------------  IN: 660 mL / OUT: 0 mL / NET: 660 mL          Gen: Appears well in NAD  HEENT:  (-)icterus (-)pallor  CV: N S1 S2 1/6 ANN (+)2 Pulses B/l  Resp:  Clear to ausculatation B/L, normal effort  GI: (+) BS Soft, NT, ND  Lymph:  (-)Edema, (-)obvious lymphadenopathy  Skin: Warm to touch, Normal turgor  Psych: Appropriate mood and affect        ASSESSMENT/PLAN: 	53y  Female  HTN and nephrolithiasis s/p ESWL years ago. Pt recently came to this er with severe right flank pain had imaging done on 1/2/2024 indicated 9mm right mid ureteral stone urgent stent placement symptoms of intermittent hematuria, urgency and frequency pt discharged Pt is now s/p  cystoscopy right retrograde pyelogram right ureteroscopy.     # CP  - Non-anginal CP second set of CE (-)  - Obtain reports from dr. Forest Avila.  Patient reports she had a normal nuclear stress test in june that was reportedly normal (call placed )  - f/u echo  - consider other causes of CP    # S/p cystoscopy  -  f/u    Sher Borden MD, [facility]  BEEPER [phone]

## 2024-02-29 NOTE — PROGRESS NOTE ADULT - ASSESSMENT
53y Female who is s/p R URS and stent placement with chest pain postoperatively, CTA, trops and EKG negative; chest pain resolved    Plan:  - Reg diet   - Incentive Spirometry  - OOB and ambulating as tolerated  - DVT ppx  - Fu echo and discharge today pending cards clearance    Discussed with Dr. Spencer

## 2024-02-29 NOTE — DISCHARGE NOTE PROVIDER - CARE PROVIDER_API CALL
Zahra Spencer [facility]  Urology  [address] [place] [address]  Phone: [phone]  Fax: [phone]  Follow Up Time:     Sher Borden  Cardiovascular Disease  [address], [address]  [place] [address]  Phone: [phone]  Fax: [phone]  Follow Up Time:

## 2024-02-29 NOTE — DISCHARGE NOTE PROVIDER - HOSPITAL COURSE
52 y/o female hx of HTN, kidney stones in the past that she passed on her own, presenting with two days of rlq pain and right flank pain. Pt presented to [facility] for scheduled cystoscopy, R URS, and R stent placement. Pt underwent to procedure without complications. Postoperatively, pt c/o chest pain in the PACU. Pt seen and examined, c/o mid-sternal non-radiating chest pain, rated a 7/10, unrelieved by Dilaudid. Vitals remained stable. Troponins negative and EKG normal. Pain did not subside with pain medications. Pt was admitted to surgical service for observation. Cardiology consulted. On hospital day #2, echocardiogram populated as normal and pt's chest pain completely resolved. At the time of discharge, the patient was hemodynamically stable, was tolerating PO diet, was voiding urine, was ambulating, and was comfortable with adequate pain control. The patient was instructed to follow up with Dr. Spencer within 1-2 weeks after discharge from the hospital. Additionally, Pt should follow up with cardiologist and PCP. The patient/family felt comfortable with discharge. The patient is stable and ready for discharge to home. The patient had no other issues.

## 2024-03-01 PROBLEM — I10 ESSENTIAL (PRIMARY) HYPERTENSION: Chronic | Status: ACTIVE | Noted: 2024-02-07

## 2024-03-01 PROCEDURE — 93306 TTE W/DOPPLER COMPLETE: CPT

## 2024-03-01 PROCEDURE — 80048 BASIC METABOLIC PNL TOTAL CA: CPT

## 2024-03-01 PROCEDURE — 82553 CREATINE MB FRACTION: CPT

## 2024-03-01 PROCEDURE — 88300 SURGICAL PATH GROSS: CPT

## 2024-03-01 PROCEDURE — 76000 FLUOROSCOPY <1 HR PHYS/QHP: CPT

## 2024-03-01 PROCEDURE — 93005 ELECTROCARDIOGRAM TRACING: CPT

## 2024-03-01 PROCEDURE — 36415 COLL VENOUS BLD VENIPUNCTURE: CPT

## 2024-03-01 PROCEDURE — 82550 ASSAY OF CK (CPK): CPT

## 2024-03-01 PROCEDURE — 85027 COMPLETE CBC AUTOMATED: CPT

## 2024-03-01 PROCEDURE — C1889: CPT

## 2024-03-01 PROCEDURE — 84484 ASSAY OF TROPONIN QUANT: CPT

## 2024-03-01 PROCEDURE — C2617: CPT

## 2024-03-01 PROCEDURE — 80053 COMPREHEN METABOLIC PANEL: CPT

## 2024-03-01 PROCEDURE — 82365 CALCULUS SPECTROSCOPY: CPT

## 2024-03-01 PROCEDURE — 71275 CT ANGIOGRAPHY CHEST: CPT | Mod: MC

## 2024-03-01 NOTE — CHART NOTE - NSCHARTNOTEFT_GEN_A_CORE
CARDIOLOGY ATTENDING ADDENDUM    - Received Records from [facility] pt under went a dobutamine  nuclear stress test 10/13/23 had an abnormal EKG response but normal myocradial perfusion  - oupt anadiac f/u with Dr. Forest back fro cardiology    Sher Borden MD, [facility]  BEEPER [phone]

## 2024-03-04 ENCOUNTER — APPOINTMENT (OUTPATIENT)
Age: 54
End: 2024-03-04
Payer: MEDICAID

## 2024-03-04 VITALS
BODY MASS INDEX: 26.46 KG/M2 | SYSTOLIC BLOOD PRESSURE: 126 MMHG | DIASTOLIC BLOOD PRESSURE: 80 MMHG | WEIGHT: 155 LBS | HEIGHT: 64 IN | TEMPERATURE: 93.5 F | HEART RATE: 77 BPM | OXYGEN SATURATION: 95 %

## 2024-03-04 PROCEDURE — 52310 CYSTOSCOPY AND TREATMENT: CPT

## 2024-03-05 LAB
CELL MATERIAL STONE EST-MCNT: SIGNIFICANT CHANGE UP
LABORATORY COMMENT REPORT: SIGNIFICANT CHANGE UP
NIDUS STONE QN: SIGNIFICANT CHANGE UP

## 2024-03-15 LAB — SURGICAL PATHOLOGY STUDY: SIGNIFICANT CHANGE UP

## 2024-04-04 ENCOUNTER — APPOINTMENT (OUTPATIENT)
Age: 54
End: 2024-04-04

## 2024-05-06 ENCOUNTER — APPOINTMENT (OUTPATIENT)
Dept: UROLOGY | Facility: CLINIC | Age: 54
End: 2024-05-06
Payer: MEDICAID

## 2024-05-06 VITALS
SYSTOLIC BLOOD PRESSURE: 138 MMHG | TEMPERATURE: 97.4 F | OXYGEN SATURATION: 96 % | HEART RATE: 75 BPM | DIASTOLIC BLOOD PRESSURE: 85 MMHG

## 2024-05-06 DIAGNOSIS — N20.0 CALCULUS OF KIDNEY: ICD-10-CM

## 2024-05-06 PROCEDURE — 99214 OFFICE O/P EST MOD 30 MIN: CPT

## 2024-05-13 NOTE — ED PROVIDER NOTE - ADMIT DISPOSITION PRESENT ON ADMISSION SEPSIS
Patient: Caleb Nieves    Procedure Information       Date/Time: 05/13/24 1000    Scheduled providers: Apollo Brown MD    Procedure: COLONOSCOPY    Location: [facility] OR            Relevant Problems   Anesthesia (within normal limits)  Pt has never had anesthesia before.  No family hx of problems with anesthesia.        Cardiac   (+) Benign essential hypertension      Pulmonary (within normal limits)      Neuro (within normal limits)      GI (within normal limits)      /Renal (within normal limits)      Liver (within normal limits)      Endocrine   (+) Class 2 obesity with body mass index (BMI) of 37.0 to 37.9 in adult      Hematology (within normal limits)      Musculoskeletal (within normal limits)      HEENT (within normal limits)      ID (within normal limits)      Skin (within normal limits)      GYN (within normal limits)       Clinical information reviewed:   Tobacco  Allergies  Meds   Med Hx  Surg Hx   Fam Hx  Soc Hx        NPO Detail:  NPO/Void Status  Date of Last Liquid: 05/13/24  Time of Last Liquid: 0400  Date of Last Solid: 05/12/24  Time of Last Solid: 0900  Last Intake Type: GI prep; Light meal  Time of Last Void: 0800         Physical Exam    Airway  Mallampati: I  TM distance: >3 FB  Neck ROM: full     Cardiovascular    Dental - normal exam     Pulmonary    Abdominal        Anesthesia Plan    History of general anesthesia?: no  History of complications of general anesthesia?: no    ASA 3     MAC     intravenous induction   Anesthetic plan and risks discussed with patient.    Plan discussed with CAA.  
No

## 2024-05-14 PROBLEM — N20.0 NEPHROLITHIASIS: Status: ACTIVE | Noted: 2024-01-22

## 2024-05-14 LAB
ANION GAP SERPL CALC-SCNC: 9 MMOL/L
BUN SERPL-MCNC: 11 MG/DL
CALCIUM SERPL-MCNC: 9.3 MG/DL
CALCIUM SERPL-MCNC: 9.3 MG/DL
CHLORIDE SERPL-SCNC: 105 MMOL/L
CO2 SERPL-SCNC: 27 MMOL/L
CREAT SERPL-MCNC: 0.75 MG/DL
EGFR: 95 ML/MIN/1.73M2
GLUCOSE SERPL-MCNC: 100 MG/DL
PARATHYROID HORMONE INTACT: 64 PG/ML
POTASSIUM SERPL-SCNC: 4.1 MMOL/L
SODIUM SERPL-SCNC: 141 MMOL/L
URATE SERPL-MCNC: 4.1 MG/DL

## 2024-05-14 NOTE — PHYSICAL EXAM
[Normal Appearance] : normal appearance [Well Groomed] : well groomed [General Appearance - In No Acute Distress] : no acute distress [Edema] : no peripheral edema [Respiration, Rhythm And Depth] : normal respiratory rhythm and effort [Exaggerated Use Of Accessory Muscles For Inspiration] : no accessory muscle use [Abdomen Tenderness] : non-tender [Abdomen Soft] : soft [Costovertebral Angle Tenderness] : no ~M costovertebral angle tenderness [Urinary Bladder Findings] : the bladder was normal on palpation [] : no rash [No Focal Deficits] : no focal deficits [Oriented To Time, Place, And Person] : oriented to person, place, and time [Affect] : the affect was normal [Mood] : the mood was normal

## 2024-05-14 NOTE — ASSESSMENT
[FreeTextEntry1] : 52 yo F s/p URS/HLL for nephrolithiasis  - Reviewed stone analysis 80% Calcium oxalate monohydrate. 10% Calcium oxalate dihydrate.  10% Calcium phosphate (apatite) - Discussed diet and behavioral modifications - Discussed possible etiologies for nephrolithiasis. Reviewed behavioral modifications including adequate hydration, cutting back on coffee, dark sodas, dark teas, low sodium diet, increasing citrate levels with lemon juice.  - Discussed importance of seeking medical attention should intractable flank pain with nausea, vomiting or fever occur - Given recurrent nephrolithiasis, will plan for metabolic stone workup - BMP, uric acid, PTH, litholink

## 2024-05-14 NOTE — HISTORY OF PRESENT ILLNESS
[FreeTextEntry1] : 52 yo F presented with history of nephrolithaisis s/p ESWL many years ago Presented to ER with severe flank pain and found to have a 9mm right mid ureteral stone s/p urgent stent placement and here to discuss definitive stone management Having urinary urgency and frequency intermittent gross hematuria No fever, chills Some nausea  5/6/24 Interval history: Pt is s/p right URS/HLL with subsequent stent removal Doing well with no recurrence of gross hematuria No fever, chills

## 2024-07-02 NOTE — PATIENT PROFILE ADULT - MONEY FOR FOOD
"Anesthesia Transfer of Care Note    Patient: Maru Camacho    Procedure(s) Performed: Procedure(s) (LRB):  EXCISION, GANGLION CYST, WRIST (Left)    Patient location: OPS    Anesthesia Type: general    Transport from OR: Transported from OR on room air with adequate spontaneous ventilation    Post pain: adequate analgesia    Post assessment: no apparent anesthetic complications    Post vital signs: stable    Level of consciousness: awake and alert    Nausea/Vomiting: no nausea/vomiting    Complications: none    Transfer of care protocol was followed      Last vitals: Visit Vitals  BP (!) 182/94   Pulse 64   Temp 36.8 °C (98.2 °F) (Skin)   Resp 18   Ht 5' 3" (1.6 m)   Wt 108.9 kg (240 lb)   LMP 07/10/2023 (Exact Date)   SpO2 98%   Breastfeeding No   BMI 42.51 kg/m²     "
no

## 2024-08-08 ENCOUNTER — APPOINTMENT (OUTPATIENT)
Age: 54
End: 2024-08-08

## 2024-11-06 NOTE — ED PROVIDER NOTE - NSPTACCESSSVCSAPPTDETAILS_ED_ALL_ED_FT
Visit Time    Visit Start Time: 0823  Visit Stop Time: 0824  Total Visit Duration:  1 minutes    Subjective:     Patient ID: Davian Varghese is a 18 y.o. female.    [facility] Clinical Progress Notes      Specialized Services Documentation  Therapist must complete separate progress note for each specific clinical activity in which the individual participated during the day.     Case Management Note    LINO Ramirez    Current suicide risk : Unable to assess due to absence.    Davian Varghese was absent from program today 11/06/24 due to having a feeling very sick after having a panic attack. CM contacted Davian Varghese at 0823 and left a voicemail requesting a call back. This writer informed Davian that we have yet to receive her consent for treatment packet that she stated she was sending via email. This writer informed her that if she could not sign the packet and send it back that we could complete one for her. Informed Davian since she was absent from program today that she would not be able to attend her medication check.     Medications changes/added/denied? No    Treatment session number: N/A    Individual Case Management Visit provided today? No    [facility] follow up physician's orders: None at this time.      Hemorrhoids worsening, needs follow up with surgery or GI

## 2024-11-07 ENCOUNTER — APPOINTMENT (OUTPATIENT)
Dept: FAMILY MEDICINE | Facility: CLINIC | Age: 54
End: 2024-11-07
Payer: MEDICAID

## 2024-11-07 VITALS
HEIGHT: 64 IN | SYSTOLIC BLOOD PRESSURE: 135 MMHG | BODY MASS INDEX: 27.31 KG/M2 | OXYGEN SATURATION: 97 % | WEIGHT: 160 LBS | DIASTOLIC BLOOD PRESSURE: 90 MMHG | TEMPERATURE: 98 F | HEART RATE: 72 BPM

## 2024-11-07 DIAGNOSIS — I10 ESSENTIAL (PRIMARY) HYPERTENSION: ICD-10-CM

## 2024-11-07 DIAGNOSIS — Z87.442 PERSONAL HISTORY OF URINARY CALCULI: ICD-10-CM

## 2024-11-07 DIAGNOSIS — Z01.818 ENCOUNTER FOR OTHER PREPROCEDURAL EXAMINATION: ICD-10-CM

## 2024-11-07 DIAGNOSIS — H11.30 CONJUNCTIVAL HEMORRHAGE, UNSPECIFIED EYE: ICD-10-CM

## 2024-11-07 DIAGNOSIS — Z12.39 ENCOUNTER FOR OTHER SCREENING FOR MALIGNANT NEOPLASM OF BREAST: ICD-10-CM

## 2024-11-07 DIAGNOSIS — K21.9 GASTRO-ESOPHAGEAL REFLUX DISEASE W/OUT ESOPHAGITIS: ICD-10-CM

## 2024-11-07 DIAGNOSIS — Z12.11 ENCOUNTER FOR SCREENING FOR MALIGNANT NEOPLASM OF COLON: ICD-10-CM

## 2024-11-07 PROCEDURE — G2211 COMPLEX E/M VISIT ADD ON: CPT | Mod: NC

## 2024-11-07 PROCEDURE — 99214 OFFICE O/P EST MOD 30 MIN: CPT

## 2024-11-07 RX ORDER — ESOMEPRAZOLE MAGNESIUM 20 MG/1
20 CAPSULE, DELAYED RELEASE ORAL
Refills: 0 | Status: ACTIVE | COMMUNITY

## 2024-11-08 LAB
ALBUMIN SERPL ELPH-MCNC: 4.2 G/DL
ALP BLD-CCNC: 134 U/L
ALT SERPL-CCNC: 23 U/L
ANION GAP SERPL CALC-SCNC: 13 MMOL/L
APTT BLD: 30.4 SEC
AST SERPL-CCNC: 16 U/L
BILIRUB SERPL-MCNC: 0.4 MG/DL
BUN SERPL-MCNC: 8 MG/DL
CALCIUM SERPL-MCNC: 9.4 MG/DL
CHLORIDE SERPL-SCNC: 105 MMOL/L
CO2 SERPL-SCNC: 24 MMOL/L
CREAT SERPL-MCNC: 0.84 MG/DL
EGFR: 82 ML/MIN/1.73M2
GLUCOSE SERPL-MCNC: 108 MG/DL
HCT VFR BLD CALC: 43.5 %
HGB BLD-MCNC: 13.8 G/DL
INR PPP: 0.97 RATIO
MCHC RBC-ENTMCNC: 29.5 PG
MCHC RBC-ENTMCNC: 31.7 G/DL
MCV RBC AUTO: 92.9 FL
PLATELET # BLD AUTO: 185 K/UL
POTASSIUM SERPL-SCNC: 4.1 MMOL/L
PROT SERPL-MCNC: 7.2 G/DL
PT BLD: 11.5 SEC
RBC # BLD: 4.68 M/UL
RBC # FLD: 12.7 %
SODIUM SERPL-SCNC: 141 MMOL/L
WBC # FLD AUTO: 4.83 K/UL

## 2024-12-31 NOTE — ED ADULT NURSE NOTE - NS_NURSE_DISC_TEACHING_YN_ED_ALL_ED
Chief Complaint  Dizziness (Pt complains of dizziness, started yesterday./Pt is feels unsteady when walking. /Pt states she has taken a meclizine and a patch but this did not help./States her BP yesterday was 120s/50s; This morning BP was 168/72 which is high but closer to her normal./States she fell ), Hypertension, Fall (Pt's daughter states pt has fallen 3 times recently: 12/26, 12/27, and last month./Pt states her right knee thi and she is unable to straighten it out./States she doesn't feel pain in her knee and is unrelated to her dizzy spells.), and Depression (Pt complains of fatigue in the past month./States it has been off and on./States she believes the fatigue is related to her depression. Has been off depression meds in the past year./)    Subjective      Bharti Nobels presents to [facility]    Dizziness  Pertinent negatives include no abdominal pain, arthralgias, chest pain, congestion, coughing, fatigue or fever.   Hypertension  Pertinent negatives include no blurred vision, chest pain, palpitations or shortness of breath.   Fall  Pertinent negatives include no abdominal pain, fever or hematuria.   DepressionSymptoms include dizziness. Patient is not experiencing: confusion, depressed mood, palpitations, shortness of breath and chest pain.  Her past medical history is significant for depression.     History of present illness:  Patient is 78-year-old female with underlying hypertension, hyperlipidemia, history of gastric ulcer, and recurrent dizziness, who was seen in 4/24 as a new patient, and who is coming back in now 7/24 for initial laboratory follow-up.  We will review her med list, go over recent labs together, address care gaps and new concerns she may have.---> Patient seen in 8/24 for urgent visit/ER follow-up in regards to dizziness and she is being seen again for this in 12/24.    Review of Systems   Constitutional:  Negative for appetite  "change, fatigue and fever.   HENT:  Negative for congestion and ear pain.    Eyes:  Negative for blurred vision.   Respiratory:  Negative for cough, chest tightness, shortness of breath and wheezing.    Cardiovascular:  Negative for chest pain, palpitations and leg swelling.   Gastrointestinal:  Negative for abdominal pain.   Genitourinary:  Negative for difficulty urinating, dysuria and hematuria.   Musculoskeletal:  Negative for arthralgias and gait problem.   Skin:  Negative for skin lesions.   Neurological:  Positive for dizziness. Negative for syncope, memory problem and confusion.   Psychiatric/Behavioral:  Negative for self-injury and depressed mood.        Objective   Vital Signs:   /74 (BP Location: Right arm, Patient Position: Sitting, Cuff Size: Adult)   Pulse 55   Temp 97.5 °F (36.4 °C) (Skin)   Ht 152.4 cm (60\")   Wt 67 kg (147 lb 9.6 oz)   SpO2 97%   BMI 28.83 kg/m²         Physical Exam  Vitals and nursing note reviewed.   Constitutional:       General: She is not in acute distress.     Appearance: Normal appearance. She is not toxic-appearing.   HENT:      Head: Atraumatic.      Right Ear: External ear normal.      Left Ear: External ear normal.      Nose: Nose normal.      Mouth/Throat:      Mouth: Mucous membranes are moist.   Eyes:      General:         Right eye: No discharge.         Left eye: No discharge.      Extraocular Movements: Extraocular movements intact.      Pupils: Pupils are equal, round, and reactive to light.   Neck:      Comments: No carotid bruits.  Cardiovascular:      Rate and Rhythm: Normal rate and regular rhythm.      Pulses: Normal pulses.      Heart sounds: Normal heart sounds. No murmur heard.     No gallop.      Comments: Normal heart tones, no ectopy, no S3.  Pulmonary:      Effort: Pulmonary effort is normal. No respiratory distress.      Breath sounds: No wheezing, rhonchi or rales.      Comments: No labored respirations.  Abdominal:      General: There " is no distension.      Palpations: Abdomen is soft. There is no mass.      Tenderness: There is no abdominal tenderness. There is no guarding.   Musculoskeletal:         General: No swelling or tenderness.      Cervical back: No tenderness.      Right lower leg: No edema.      Left lower leg: No edema.      Comments: No edema.   Skin:     General: Skin is warm and dry.      Findings: No rash.   Neurological:      General: No focal deficit present.      Mental Status: She is alert and oriented to person, place, and time. Mental status is at baseline.      Motor: No weakness.      Gait: Gait normal.   Psychiatric:         Mood and Affect: Mood normal.         Thought Content: Thought content normal.          Result Review   The following data was reviewed by: Reza Oakes MD on 03/21/2024:  [x] Laboratory  [] Microbiology  [x] Radiology  [x] EKG/telemetry  [] Cardiology/Vascular  [] Pathology  [x] Old records             Assessment and Plan   Diagnoses and all orders for this visit:    1. History of dizziness (Primary)  Overview:  Head CT 8/24:  CT scan of the head without IV contrast demonstrating mild atrophy and white matter changes.  2.  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 No acute intracranial abnormality is seen.    Head CT 11/22:  No acute intracranial abnormality is present. No evidence of acute cortical infarction, hemorrhage, mass or mass effect. No hydrocephalus or abnormal extra-axial fluid collections are present. The posterior fossa is unremarkable.   The skull base and calvarium are intact. The included portions of the paranasal sinuses and mastoid air cells are clear.     Assessment & Plan:  Patient apparently was felt to have BPPV when she was seen in the ER in 8/24, she is actually having stabbing and dizziness again as of her 12/24 OV, but she is not having any vertiginous symptoms.  She just feels off balance when she is up  and ambulating.  She has not had any significantly low blood pressures at home.      Her blood pressure is certainly very reasonable presently and she is not orthostatic.    She missed the appt for the MRI/MRA's we ordered, but so will place them again.    Refer to Neuro as well.    Orders:  -     MRI Brain With & Without Contrast; Future  -     MRI Angiogram Neck With & Without Contrast; Future  -     MRI Angiogram Head Without Contrast; Future    2. Dizziness on standing  -     Ambulatory Referral to Neurology    3. Recurrent falls while walking  -     Ambulatory Referral to Neurology    4. Knee gives out, right  Assessment & Plan:  This is as of her 12/24 OV.  She has seen Dr. Watt for hand issues in the past, will ask him to evaluate this in a timely manner.    Orders:  -     Ambulatory Referral to Orthopedic Surgery    5. Essential hypertension  Assessment & Plan:  Patient's blood pressure just mildly elevated as of her 12/24 OV.  She reports getting normal readings on average at home, so she is just using both valsartan and amlodipine as needed.      6. Recurrent major depression in partial remission  Assessment & Plan:  Patient was on 20 mg of Lexapro previously, she feels issues creeping back in as of her 12/24 OV.  Recommend we get her started on 10 mg initially and evaluate after that.      Other orders  -     tiZANidine (ZANAFLEX) 4 MG tablet; Take 1 tablet by mouth At Night As Needed for Muscle Spasms.  Dispense: 90 tablet; Refill: 1  -     escitalopram (Lexapro) 10 MG tablet; Take 1 tablet by mouth Daily.  Dispense: 90 tablet; Refill: 1              BMI is >= 25 and <30. (Overweight) The following options were offered after discussion;: exercise counseling/recommendations and nutrition counseling/recommendations            Follow Up   Return for Next scheduled follow up.  Patient was given instructions and counseling regarding her condition or for health maintenance advice. Please see specific  information pulled into the AVS if appropriate.     Total Time Spent:   minutes     This time includes time spent by me in the following activities: preparing for the visit, reviewing extensive past medical history and tests, performing a medically appropriate examination and/or evaluation, counseling and educating the patient and/or caregivers, ordering medications, tests, or procedures, referring and/or communicating with other health care professionals and documenting information in the medical record all on this date of service.      Yes

## 2025-02-07 ENCOUNTER — APPOINTMENT (OUTPATIENT)
Dept: FAMILY MEDICINE | Facility: CLINIC | Age: 55
End: 2025-02-07

## 2025-06-23 NOTE — ED ADULT NURSE NOTE - NS ED NURSE LEVEL OF CONSCIOUSNESS AFFECT
Detail Level: Detailed Calm Quality 47: Advance Care Plan: Advance Care Planning discussed and documented; advance care plan or surrogate decision maker documented in the medical record. Quality 394b: Td/Tdap Immunizations For Adolescents: Patient did not have one Tdap or one Td vaccine on or between the patient's 10th and 13th birthdays.

## 2025-08-24 ENCOUNTER — EMERGENCY (EMERGENCY)
Facility: HOSPITAL | Age: 55
LOS: 1 days | End: 2025-08-24
Attending: EMERGENCY MEDICINE
Payer: MEDICAID

## 2025-08-24 VITALS
OXYGEN SATURATION: 97 % | TEMPERATURE: 98 F | HEART RATE: 65 BPM | DIASTOLIC BLOOD PRESSURE: 70 MMHG | SYSTOLIC BLOOD PRESSURE: 141 MMHG | RESPIRATION RATE: 18 BRPM

## 2025-08-24 VITALS
TEMPERATURE: 98 F | RESPIRATION RATE: 118 BRPM | OXYGEN SATURATION: 96 % | HEIGHT: 65 IN | DIASTOLIC BLOOD PRESSURE: 84 MMHG | HEART RATE: 75 BPM | SYSTOLIC BLOOD PRESSURE: 130 MMHG | WEIGHT: 164.46 LBS

## 2025-08-24 DIAGNOSIS — Z90.49 ACQUIRED ABSENCE OF OTHER SPECIFIED PARTS OF DIGESTIVE TRACT: Chronic | ICD-10-CM

## 2025-08-24 DIAGNOSIS — Z90.79 ACQUIRED ABSENCE OF OTHER GENITAL ORGAN(S): Chronic | ICD-10-CM

## 2025-08-24 DIAGNOSIS — Z98.890 OTHER SPECIFIED POSTPROCEDURAL STATES: Chronic | ICD-10-CM

## 2025-08-24 LAB
ALBUMIN SERPL ELPH-MCNC: 3.6 G/DL — SIGNIFICANT CHANGE UP (ref 3.5–5)
ALP SERPL-CCNC: 120 U/L — SIGNIFICANT CHANGE UP (ref 40–120)
ALT FLD-CCNC: 38 U/L DA — SIGNIFICANT CHANGE UP (ref 10–60)
ANION GAP SERPL CALC-SCNC: 6 MMOL/L — SIGNIFICANT CHANGE UP (ref 5–17)
AST SERPL-CCNC: 19 U/L — SIGNIFICANT CHANGE UP (ref 10–40)
BASOPHILS # BLD AUTO: 0.04 K/UL — SIGNIFICANT CHANGE UP (ref 0–0.2)
BASOPHILS NFR BLD AUTO: 0.8 % — SIGNIFICANT CHANGE UP (ref 0–2)
BILIRUB SERPL-MCNC: 0.3 MG/DL — SIGNIFICANT CHANGE UP (ref 0.2–1.2)
BUN SERPL-MCNC: 16 MG/DL — SIGNIFICANT CHANGE UP (ref 7–18)
CALCIUM SERPL-MCNC: 8.7 MG/DL — SIGNIFICANT CHANGE UP (ref 8.4–10.5)
CHLORIDE SERPL-SCNC: 108 MMOL/L — SIGNIFICANT CHANGE UP (ref 96–108)
CK SERPL-CCNC: 65 U/L — SIGNIFICANT CHANGE UP (ref 21–215)
CO2 SERPL-SCNC: 28 MMOL/L — SIGNIFICANT CHANGE UP (ref 22–31)
CREAT SERPL-MCNC: 0.77 MG/DL — SIGNIFICANT CHANGE UP (ref 0.5–1.3)
EGFR: 91 ML/MIN/1.73M2 — SIGNIFICANT CHANGE UP
EGFR: 91 ML/MIN/1.73M2 — SIGNIFICANT CHANGE UP
EOSINOPHIL # BLD AUTO: 0.09 K/UL — SIGNIFICANT CHANGE UP (ref 0–0.5)
EOSINOPHIL NFR BLD AUTO: 1.9 % — SIGNIFICANT CHANGE UP (ref 0–6)
FLUAV AG NPH QL: SIGNIFICANT CHANGE UP
FLUBV AG NPH QL: SIGNIFICANT CHANGE UP
GLUCOSE SERPL-MCNC: 111 MG/DL — HIGH (ref 70–99)
HCT VFR BLD CALC: 38.5 % — SIGNIFICANT CHANGE UP (ref 34.5–45)
HGB BLD-MCNC: 13 G/DL — SIGNIFICANT CHANGE UP (ref 11.5–15.5)
IMM GRANULOCYTES NFR BLD AUTO: 0.2 % — SIGNIFICANT CHANGE UP (ref 0–0.9)
LIDOCAIN IGE QN: 37 U/L — SIGNIFICANT CHANGE UP (ref 13–75)
LYMPHOCYTES # BLD AUTO: 1.93 K/UL — SIGNIFICANT CHANGE UP (ref 1–3.3)
LYMPHOCYTES # BLD AUTO: 40.1 % — SIGNIFICANT CHANGE UP (ref 13–44)
MAGNESIUM SERPL-MCNC: 2 MG/DL — SIGNIFICANT CHANGE UP (ref 1.6–2.6)
MCHC RBC-ENTMCNC: 30 PG — SIGNIFICANT CHANGE UP (ref 27–34)
MCHC RBC-ENTMCNC: 33.8 G/DL — SIGNIFICANT CHANGE UP (ref 32–36)
MCV RBC AUTO: 88.9 FL — SIGNIFICANT CHANGE UP (ref 80–100)
MONOCYTES # BLD AUTO: 0.45 K/UL — SIGNIFICANT CHANGE UP (ref 0–0.9)
MONOCYTES NFR BLD AUTO: 9.4 % — SIGNIFICANT CHANGE UP (ref 2–14)
NEUTROPHILS # BLD AUTO: 2.29 K/UL — SIGNIFICANT CHANGE UP (ref 1.8–7.4)
NEUTROPHILS NFR BLD AUTO: 47.6 % — SIGNIFICANT CHANGE UP (ref 43–77)
NRBC BLD AUTO-RTO: 0 /100 WBCS — SIGNIFICANT CHANGE UP (ref 0–0)
PLATELET # BLD AUTO: 169 K/UL — SIGNIFICANT CHANGE UP (ref 150–400)
POTASSIUM SERPL-MCNC: 4.1 MMOL/L — SIGNIFICANT CHANGE UP (ref 3.5–5.3)
POTASSIUM SERPL-SCNC: 4.1 MMOL/L — SIGNIFICANT CHANGE UP (ref 3.5–5.3)
PROT SERPL-MCNC: 7.2 G/DL — SIGNIFICANT CHANGE UP (ref 6–8.3)
RBC # BLD: 4.33 M/UL — SIGNIFICANT CHANGE UP (ref 3.8–5.2)
RBC # FLD: 11.7 % — SIGNIFICANT CHANGE UP (ref 10.3–14.5)
RSV RNA NPH QL NAA+NON-PROBE: SIGNIFICANT CHANGE UP
SARS-COV-2 RNA SPEC QL NAA+PROBE: SIGNIFICANT CHANGE UP
SODIUM SERPL-SCNC: 142 MMOL/L — SIGNIFICANT CHANGE UP (ref 135–145)
SOURCE RESPIRATORY: SIGNIFICANT CHANGE UP
TROPONIN I, HIGH SENSITIVITY RESULT: 4.6 NG/L — SIGNIFICANT CHANGE UP
WBC # BLD: 4.81 K/UL — SIGNIFICANT CHANGE UP (ref 3.8–10.5)
WBC # FLD AUTO: 4.81 K/UL — SIGNIFICANT CHANGE UP (ref 3.8–10.5)

## 2025-08-24 PROCEDURE — 87637 SARSCOV2&INF A&B&RSV AMP PRB: CPT

## 2025-08-24 PROCEDURE — 83690 ASSAY OF LIPASE: CPT

## 2025-08-24 PROCEDURE — 71045 X-RAY EXAM CHEST 1 VIEW: CPT

## 2025-08-24 PROCEDURE — 71045 X-RAY EXAM CHEST 1 VIEW: CPT | Mod: 26

## 2025-08-24 PROCEDURE — 36000 PLACE NEEDLE IN VEIN: CPT

## 2025-08-24 PROCEDURE — 99285 EMERGENCY DEPT VISIT HI MDM: CPT | Mod: 25

## 2025-08-24 PROCEDURE — 99285 EMERGENCY DEPT VISIT HI MDM: CPT

## 2025-08-24 PROCEDURE — 36415 COLL VENOUS BLD VENIPUNCTURE: CPT

## 2025-08-24 PROCEDURE — 83735 ASSAY OF MAGNESIUM: CPT

## 2025-08-24 PROCEDURE — 80053 COMPREHEN METABOLIC PANEL: CPT

## 2025-08-24 PROCEDURE — 82550 ASSAY OF CK (CPK): CPT

## 2025-08-24 PROCEDURE — 93005 ELECTROCARDIOGRAM TRACING: CPT

## 2025-08-24 PROCEDURE — 85025 COMPLETE CBC W/AUTO DIFF WBC: CPT

## 2025-08-24 PROCEDURE — 84484 ASSAY OF TROPONIN QUANT: CPT

## 2025-08-24 RX ORDER — ASPIRIN 325 MG
162 TABLET ORAL ONCE
Refills: 0 | Status: COMPLETED | OUTPATIENT
Start: 2025-08-24 | End: 2025-08-24

## 2025-08-24 RX ADMIN — Medication 162 MILLIGRAM(S): at 16:54

## (undated) DEVICE — SOL IRR POUR NS 0.9% 500ML

## (undated) DEVICE — SYR LUER LOK 10CC

## (undated) DEVICE — GOWN XL

## (undated) DEVICE — DRAPE C ARM UNIVERSAL

## (undated) DEVICE — WARMING BLANKET UPPER ADULT

## (undated) DEVICE — PACK CYSTO

## (undated) DEVICE — SOL IRR BAG NS 0.9% 3000ML

## (undated) DEVICE — SYR LUER LOK 20CC

## (undated) DEVICE — GLV 6.5 PROTEXIS (WHITE)

## (undated) DEVICE — SOL IRR POUR H2O 500ML

## (undated) DEVICE — VENODYNE/SCD SLEEVE CALF MEDIUM